# Patient Record
Sex: FEMALE | Race: WHITE | HISPANIC OR LATINO | Employment: FULL TIME | URBAN - METROPOLITAN AREA
[De-identification: names, ages, dates, MRNs, and addresses within clinical notes are randomized per-mention and may not be internally consistent; named-entity substitution may affect disease eponyms.]

---

## 2017-10-12 ENCOUNTER — APPOINTMENT (OUTPATIENT)
Dept: ULTRASOUND IMAGING | Facility: CLINIC | Age: 52
End: 2017-10-12
Attending: EMERGENCY MEDICINE
Payer: COMMERCIAL

## 2017-10-12 ENCOUNTER — APPOINTMENT (OUTPATIENT)
Dept: CT IMAGING | Facility: CLINIC | Age: 52
End: 2017-10-12
Attending: EMERGENCY MEDICINE
Payer: COMMERCIAL

## 2017-10-12 ENCOUNTER — HOSPITAL ENCOUNTER (OUTPATIENT)
Facility: CLINIC | Age: 52
Setting detail: OBSERVATION
Discharge: HOME | End: 2017-10-13
Attending: EMERGENCY MEDICINE | Admitting: HOSPITALIST
Payer: COMMERCIAL

## 2017-10-12 DIAGNOSIS — L03.116 CELLULITIS OF LEFT LOWER EXTREMITY: Primary | ICD-10-CM

## 2017-10-12 PROBLEM — L03.90 CELLULITIS: Status: ACTIVE | Noted: 2017-10-12

## 2017-10-12 LAB
ANION GAP SERPL CALCULATED.3IONS-SCNC: 4 MMOL/L (ref 3–14)
BASOPHILS # BLD AUTO: 0 10E9/L (ref 0–0.2)
BASOPHILS NFR BLD AUTO: 0 %
BUN SERPL-MCNC: 13 MG/DL (ref 7–30)
CALCIUM SERPL-MCNC: 8.6 MG/DL (ref 8.5–10.1)
CHLORIDE SERPL-SCNC: 104 MMOL/L (ref 94–109)
CO2 SERPL-SCNC: 30 MMOL/L (ref 20–32)
CREAT SERPL-MCNC: 0.65 MG/DL (ref 0.52–1.04)
D DIMER PPP FEU-MCNC: <0.3 UG/ML FEU (ref 0–0.5)
DIFFERENTIAL METHOD BLD: ABNORMAL
EOSINOPHIL # BLD AUTO: 0 10E9/L (ref 0–0.7)
EOSINOPHIL NFR BLD AUTO: 0.5 %
ERYTHROCYTE [DISTWIDTH] IN BLOOD BY AUTOMATED COUNT: 12.9 % (ref 10–15)
GFR SERPL CREATININE-BSD FRML MDRD: >90 ML/MIN/1.7M2
GLUCOSE SERPL-MCNC: 104 MG/DL (ref 70–99)
HCT VFR BLD AUTO: 35.5 % (ref 35–47)
HGB BLD-MCNC: 11.5 G/DL (ref 11.7–15.7)
IMM GRANULOCYTES # BLD: 0 10E9/L (ref 0–0.4)
IMM GRANULOCYTES NFR BLD: 0.2 %
INTERPRETATION ECG - MUSE: NORMAL
LYMPHOCYTES # BLD AUTO: 1.5 10E9/L (ref 0.8–5.3)
LYMPHOCYTES NFR BLD AUTO: 25.2 %
MCH RBC QN AUTO: 28.1 PG (ref 26.5–33)
MCHC RBC AUTO-ENTMCNC: 32.4 G/DL (ref 31.5–36.5)
MCV RBC AUTO: 87 FL (ref 78–100)
MONOCYTES # BLD AUTO: 0.7 10E9/L (ref 0–1.3)
MONOCYTES NFR BLD AUTO: 12.2 %
NEUTROPHILS # BLD AUTO: 3.8 10E9/L (ref 1.6–8.3)
NEUTROPHILS NFR BLD AUTO: 61.9 %
NRBC # BLD AUTO: 0 10*3/UL
NRBC BLD AUTO-RTO: 0 /100
PLATELET # BLD AUTO: 203 10E9/L (ref 150–450)
POTASSIUM SERPL-SCNC: 3.5 MMOL/L (ref 3.4–5.3)
RBC # BLD AUTO: 4.09 10E12/L (ref 3.8–5.2)
SODIUM SERPL-SCNC: 138 MMOL/L (ref 133–144)
TROPONIN I SERPL-MCNC: <0.015 UG/L (ref 0–0.04)
WBC # BLD AUTO: 6.1 10E9/L (ref 4–11)

## 2017-10-12 PROCEDURE — 25000128 H RX IP 250 OP 636: Performed by: PHYSICIAN ASSISTANT

## 2017-10-12 PROCEDURE — 84484 ASSAY OF TROPONIN QUANT: CPT | Performed by: EMERGENCY MEDICINE

## 2017-10-12 PROCEDURE — 99219 ZZC INITIAL OBSERVATION CARE,LEVL II: CPT | Performed by: PHYSICIAN ASSISTANT

## 2017-10-12 PROCEDURE — 80048 BASIC METABOLIC PNL TOTAL CA: CPT | Performed by: EMERGENCY MEDICINE

## 2017-10-12 PROCEDURE — 93005 ELECTROCARDIOGRAM TRACING: CPT

## 2017-10-12 PROCEDURE — 93971 EXTREMITY STUDY: CPT | Mod: LT

## 2017-10-12 PROCEDURE — 99285 EMERGENCY DEPT VISIT HI MDM: CPT | Mod: 25

## 2017-10-12 PROCEDURE — 99207 ZZC CDG-MDM COMPONENT: MEETS LOW - DOWN CODED: CPT | Performed by: PHYSICIAN ASSISTANT

## 2017-10-12 PROCEDURE — 25000128 H RX IP 250 OP 636: Performed by: EMERGENCY MEDICINE

## 2017-10-12 PROCEDURE — 96365 THER/PROPH/DIAG IV INF INIT: CPT | Mod: 59

## 2017-10-12 PROCEDURE — G0378 HOSPITAL OBSERVATION PER HR: HCPCS

## 2017-10-12 PROCEDURE — 85379 FIBRIN DEGRADATION QUANT: CPT | Performed by: EMERGENCY MEDICINE

## 2017-10-12 PROCEDURE — 71260 CT THORAX DX C+: CPT

## 2017-10-12 PROCEDURE — 25000132 ZZH RX MED GY IP 250 OP 250 PS 637: Performed by: PHYSICIAN ASSISTANT

## 2017-10-12 PROCEDURE — 85025 COMPLETE CBC W/AUTO DIFF WBC: CPT | Performed by: EMERGENCY MEDICINE

## 2017-10-12 RX ORDER — MULTIVITAMIN,THERAPEUTIC
1 TABLET ORAL DAILY PRN
COMMUNITY

## 2017-10-12 RX ORDER — PROCHLORPERAZINE 25 MG
25 SUPPOSITORY, RECTAL RECTAL EVERY 12 HOURS PRN
Status: DISCONTINUED | OUTPATIENT
Start: 2017-10-12 | End: 2017-10-13 | Stop reason: HOSPADM

## 2017-10-12 RX ORDER — LIDOCAINE 40 MG/G
CREAM TOPICAL
Status: DISCONTINUED | OUTPATIENT
Start: 2017-10-12 | End: 2017-10-13 | Stop reason: HOSPADM

## 2017-10-12 RX ORDER — SODIUM CHLORIDE 9 MG/ML
INJECTION, SOLUTION INTRAVENOUS CONTINUOUS
Status: DISCONTINUED | OUTPATIENT
Start: 2017-10-12 | End: 2017-10-13 | Stop reason: HOSPADM

## 2017-10-12 RX ORDER — PROCHLORPERAZINE MALEATE 5 MG
5-10 TABLET ORAL EVERY 6 HOURS PRN
Status: DISCONTINUED | OUTPATIENT
Start: 2017-10-12 | End: 2017-10-13 | Stop reason: HOSPADM

## 2017-10-12 RX ORDER — AMOXICILLIN 250 MG
1-2 CAPSULE ORAL 2 TIMES DAILY PRN
Status: DISCONTINUED | OUTPATIENT
Start: 2017-10-12 | End: 2017-10-13 | Stop reason: HOSPADM

## 2017-10-12 RX ORDER — POLYETHYLENE GLYCOL 3350 17 G/17G
17 POWDER, FOR SOLUTION ORAL DAILY PRN
Status: DISCONTINUED | OUTPATIENT
Start: 2017-10-12 | End: 2017-10-13 | Stop reason: HOSPADM

## 2017-10-12 RX ORDER — ONDANSETRON 4 MG/1
4 TABLET, ORALLY DISINTEGRATING ORAL EVERY 6 HOURS PRN
Status: DISCONTINUED | OUTPATIENT
Start: 2017-10-12 | End: 2017-10-13 | Stop reason: HOSPADM

## 2017-10-12 RX ORDER — BISACODYL 10 MG
10 SUPPOSITORY, RECTAL RECTAL DAILY PRN
Status: DISCONTINUED | OUTPATIENT
Start: 2017-10-12 | End: 2017-10-13 | Stop reason: HOSPADM

## 2017-10-12 RX ORDER — CEFTRIAXONE 2 G/1
2 INJECTION, POWDER, FOR SOLUTION INTRAMUSCULAR; INTRAVENOUS EVERY 24 HOURS
Status: DISCONTINUED | OUTPATIENT
Start: 2017-10-13 | End: 2017-10-13 | Stop reason: HOSPADM

## 2017-10-12 RX ORDER — ACETAMINOPHEN 325 MG/1
650 TABLET ORAL EVERY 4 HOURS PRN
Status: DISCONTINUED | OUTPATIENT
Start: 2017-10-12 | End: 2017-10-13 | Stop reason: HOSPADM

## 2017-10-12 RX ORDER — NALOXONE HYDROCHLORIDE 0.4 MG/ML
.1-.4 INJECTION, SOLUTION INTRAMUSCULAR; INTRAVENOUS; SUBCUTANEOUS
Status: DISCONTINUED | OUTPATIENT
Start: 2017-10-12 | End: 2017-10-13 | Stop reason: HOSPADM

## 2017-10-12 RX ORDER — IOPAMIDOL 755 MG/ML
500 INJECTION, SOLUTION INTRAVASCULAR ONCE
Status: COMPLETED | OUTPATIENT
Start: 2017-10-12 | End: 2017-10-12

## 2017-10-12 RX ORDER — ONDANSETRON 2 MG/ML
4 INJECTION INTRAMUSCULAR; INTRAVENOUS EVERY 6 HOURS PRN
Status: DISCONTINUED | OUTPATIENT
Start: 2017-10-12 | End: 2017-10-13 | Stop reason: HOSPADM

## 2017-10-12 RX ORDER — OXYCODONE HYDROCHLORIDE 5 MG/1
5-10 TABLET ORAL
Status: DISCONTINUED | OUTPATIENT
Start: 2017-10-12 | End: 2017-10-13 | Stop reason: HOSPADM

## 2017-10-12 RX ORDER — CEFTRIAXONE 2 G/1
2 INJECTION, POWDER, FOR SOLUTION INTRAMUSCULAR; INTRAVENOUS ONCE
Status: COMPLETED | OUTPATIENT
Start: 2017-10-12 | End: 2017-10-12

## 2017-10-12 RX ADMIN — SODIUM CHLORIDE 86 ML: 9 INJECTION, SOLUTION INTRAVENOUS at 12:38

## 2017-10-12 RX ADMIN — SODIUM CHLORIDE: 9 INJECTION, SOLUTION INTRAVENOUS at 15:02

## 2017-10-12 RX ADMIN — CEFTRIAXONE SODIUM 2 G: 2 INJECTION, POWDER, FOR SOLUTION INTRAMUSCULAR; INTRAVENOUS at 13:04

## 2017-10-12 RX ADMIN — ACETAMINOPHEN 650 MG: 325 TABLET, FILM COATED ORAL at 15:24

## 2017-10-12 RX ADMIN — IOPAMIDOL 63 ML: 755 INJECTION, SOLUTION INTRAVENOUS at 12:38

## 2017-10-12 ASSESSMENT — ENCOUNTER SYMPTOMS
FEVER: 1
CHILLS: 1
SHORTNESS OF BREATH: 0

## 2017-10-12 NOTE — PLAN OF CARE
Problem: Patient Care Overview  Goal: Plan of Care/Patient Progress Review  Outcome: No Change  PRIMARY DIAGNOSIS: Right leg cellulitis.   OUTPATIENT/OBSERVATION GOALS TO BE MET BEFORE DISCHARGE:  1. ADLs back to baseline: somewhat, patient has pain when she ambulates      2. Activity and level of assistance: Ambulating independently.      3. Pain status: Improved-controlled with oral pain medications.      4. Return to near baseline physical activity: Yes          Discharge Planner Nurse   Safe discharge environment identified: Yes  Barriers to discharge: No       Entered by: Triniyt Kwan 10/12/2017 4:35 PM     Please review provider order for any additional goals.   Nurse to notify provider when observation goals have been met and patient is ready for discharge.     Patient resting in bed. Patient leg outlined in marker redness and swelling. Patient pain 5 out of 10 denies needing anything for pain. Will continue to monitor.

## 2017-10-12 NOTE — ED PROVIDER NOTES
"  History     Chief Complaint:  Leg Pain    HPI   Karime Ortiz is a 52 year old female with a history of hypertension, hyperlipidemia, who presents to the emergency department today for evaluation of left leg pain. The patient experienced onset of left leg pain and redness 2 week ago. This morning at 0700, her symptoms got worse. She endorses chills, subjective fever, and occasional back pain recently, but denies shortness of breath.  She was last seen in clinic 3 days ago and was given 500 mg Keflex for 4 times daily, but states the antibiotic has not helped. The patient reports having vein surgery a couple of years ago and since then, redness in her left leg has worsened. She does not have a history of heart disease, bloodclots in her legs, or diabetes.     Allergies:  Drug allergies reviewed. No pertinent drug allergies.     Medications:    LEVOTHYROXINE SODIUM PO  LORazepam (ATIVAN PO)  cephALEXin (KEFLEX) 500 MG capsu    Past Medical History:    Anxiety  Thyroid Disease    Past Surgical History:    GYN Surgery     Family History:    Family history reviewed. No pertinent family history.    Social History:  The patient was accompanied to the ED by family.  Marital Status:       Review of Systems   Constitutional: Positive for chills and fever (subjective).   Respiratory: Negative for shortness of breath.    Musculoskeletal:        Left leg pain   All other systems reviewed and are negative.    Physical Exam     Patient Vitals for the past 24 hrs:   BP Temp Temp src Pulse Resp SpO2 Height Weight   10/12/17 1427 150/75 97.7  F (36.5  C) Oral 73 18 98 % - 65.2 kg (143 lb 11.8 oz)   10/12/17 1330 140/88 - - - - 100 % - -   10/12/17 1307 - - - - - 98 % - -   10/12/17 1306 133/76 - - - - - - -   10/12/17 1156 - - - - - 98 % - -   10/12/17 1155 141/77 - - - - - - -   10/12/17 1148 (!) 136/101 98.6  F (37  C) - 70 18 100 % 1.549 m (5' 1\") 66.7 kg (147 lb)       Physical Exam  General: Alert, appears " well-developed and well-nourished. Cooperative.     In mild distress  HEENT:  Head:  Atraumatic  Ears:  External ears are normal  Mouth/Throat:  Oropharynx is without erythema or exudate and mucous membranes are moist.   Eyes:   Conjunctivae normal and EOM are normal. No scleral icterus.    Pupils are equal, round, and reactive to light.   CV:  Normal rate, regular rhythm, normal heart sounds and radial and dorsalis pedis pulses are 2+ and symmetric.    Resp:  Breath sounds are clear bilaterally    Non-labored, no retractions or accessory muscle use  GI:  Abdomen is soft, no distension, no tenderness. No rebound or guarding.  MS:  Normal range of motion. Left lower extremity with 2+ edema.  Redness to left lower leg with no open wound. Tissue marker outlined area of redness.    Normal strength in all 4 extremities.     Back atraumatic.  kin:  Warm and dry.  No rash or lesions noted.  Neuro: Alert. Normal strength.  Sensation intact in all 4 extremities. GCS: 15  Psych:  Normal mood and affect.    Emergency Department Course     ECG:  ECG taken at 1158, ECG read at 1200  Normal sinus rhythm  Normal ECG  Rate 69 bpm. IA interval 164 ms. QRS duration 90 ms. QT/QTc 392/420 ms. P-R-T axes 24 48 27.    Imaging:  Radiology findings were communicated with the patient who voiced understanding of the findings.    US Lower Extremity Venous Duplex Left  IMPRESSION: No DVT demonstrated.    CT Chest Pulmonary Embolism w Contrast  IMPRESSION: No evidence for a pulmonary embolism. No definite acute  Abnormality.  Reading per radiology      Laboratory:  Laboratory findings were communicated with the patient who voiced understanding of the findings.    CBC: WBC 6.1, HGB 11.5 (L),   BMP: Glucose (Collected 1215) 104 (H)  o/w WNL (Creatinine 0.65)  Troponin I (Collected 1215) <0.015  D dimer (Collected 1215) <0.3     Interventions:  1304 Rocephin 2 g IV    Emergency Department Course:    1142 Nursing notes and vitals  reviewed.    1158 I performed an exam of the patient as documented above.     The patient provided a urine sample here in the emergency department. This was sent for laboratory testing, findings above.    IV was inserted and blood was drawn for laboratory testing, results above.    The patient was sent for a US lower extremity venous duplex and CT chest pulmonary embolism while in the emergency department, results above.     1314 I discussed the patient's plan of care with Dr. Cruz, hospitalist    1324 I spoke again with Dr. Cruz who agreed to admit the patient for further care.    I personally reviewed the blood, urine, CT, and US results with the patient and answered all related questions prior to admission.    I discussed the treatment plan with the patient. They expressed understanding of this plan and consented to admission. I discussed the patient with Dr. Bay Cruz, hospitalist, who will admit the patient to a monitored bed for further evaluation and treatment.    Impression & Plan      Medical Decision Making:  Karime Ortiz is a 52 year old female who presents to the emergency department today for evaluation of left lower extremity swelling worsening over the last several days with increasing redness in the lower leg area. Patient has been taking outpatient antibiotics for the last 3 days, Keflex 500 mg, without improvement. Patient notes that she is having some chest discomfort today although it appears transient and started yesterday. EKG is negative, nonischemic. Concern for possible PE with the setting of the left lower extremity swelling, I did send for a chest CT PE which was negative for pulmonary embolism. Lower extremity Doppler showed no evidence of DVT. Due to the redness and prior outpatient antibiotics with no improvement, indication for IV antibiotics.  She was given Rocephin IV for suspected cellulitis. Patient will be admitted to the hospital under the care of a  hospitalist.    Diagnosis:    ICD-10-CM    1. Cellulitis of left lower extremity L03.116      Disposition:   The patient is admitted into the care of Dr. Bay Cruz.    Scribe Disclosure:  I, Mariaelena Rojo, am serving as a scribe at 12:01 PM on 10/12/2017 to document services personally performed by Ander Amaro MD, based on my observations and the provider's statements to me.      [facility] EMERGENCY DEPARTMENT       Ander Amaro MD  10/12/17 [id number]

## 2017-10-12 NOTE — PHARMACY-ADMISSION MEDICATION HISTORY
Admission medication history interview status for this patient is complete. See [facility] admission navigator for allergy information, prior to admission medications and immunization status.     Medication history interview source(s):Patient  Medication history resources (including written lists, pill bottles, clinic record):called pharmacy patient did not know names of meds  [facility]    Changes made to PTA medication list:  Added: ASA, MVI,Lexapro, HCTZ  Deleted: lorazepam  Changed: levothyroxine    Actions taken by pharmacist (provider contacted, etc):None     Additional medication history information:None    Medication reconciliation/reorder completed by provider prior to medication history? No    Do you take OTC medications (eg tylenol, ibuprofen, fish oil, eye/ear drops, etc)? Y(Y/N)    For patients on insulin therapy:  ( N) Y/N)__________      Prior to Admission medications    Medication Sig Last Dose Taking? Auth Provider   HYDROCHLOROTHIAZIDE PO Take 25 mg by mouth daily 10/12/2017 at Unknown time Yes Unknown, Entered By History   Escitalopram Oxalate (LEXAPRO PO) Take 20 mg by mouth daily 10/12/2017 at Unknown time Yes Unknown, Entered By History   multivitamin, therapeutic (THERA-VIT) TABS tablet Take 1 tablet by mouth daily as needed Past Week at Unknown time Yes Unknown, Entered By History   ASPIRIN PO Take 325 mg by mouth daily as needed for moderate pain Past Week at Unknown time Yes Unknown, Entered By History   LEVOTHYROXINE SODIUM PO Take 75 mcg by mouth daily  10/12/2017 at Unknown time Yes Reported, Patient   cephALEXin (KEFLEX) 500 MG capsule Take 1 capsule (500 mg) by mouth 3 times daily 10/12/2017 at Unknown time Yes Armando Noel MD

## 2017-10-12 NOTE — ED NOTES
[facility]  ED Nurse Handoff Report    Karime Ortiz is a 52 year old female   ED Chief complaint: Leg Pain  . ED Diagnosis:   Final diagnoses:   Cellulitis of left lower extremity     Allergies: No Known Allergies    Code Status: Full Code  Activity level - Baseline/Home:  Independent. Activity Level - Current:   Independent. Lift room needed: No. Bariatric: No   Needed: No   Isolation: No. Infection: Not Applicable.     Vital Signs:   Vitals:    10/12/17 1156 10/12/17 1306 10/12/17 1307 10/12/17 1330   BP:  133/76  140/88   Pulse:       Resp:       Temp:       SpO2: 98%  98% 100%   Weight:       Height:           Cardiac Rhythm:  ,      Pain level:    Patient confused: No. Patient Falls Risk: No.   Elimination Status: Has voided   Patient Report - Initial Complaint: swelling and redness to  Left leg. Focused Assessment: *pain and redness, swelling to left lower leg**   Tests Performed: CT, US. Abnormal Results:   US Lower Extremity Venous Duplex Left (Final result) Result time: 10/12/17 13:16:11     Final result by Izaiah Whiting MD (10/12/17 13:16:11)     Impression:     IMPRESSION: No DVT demonstrated.    IZAIAH WHITING MD     Narrative:     ULTRASOUND VENOUS LEFT LOWER EXTREMITY  10/12/2017 1:06 PM     HISTORY: Left leg swelling and red. Evaluate for DVT.    COMPARISON: None.    TECHNIQUE: Ultrasound gray scale, Color Doppler flow, and spectral  Doppler waveform analysis performed.    FINDINGS:  The left common femoral, superficial femoral, popliteal and  posterior tibial veins are patent and fully compressible and  demonstrate normal venous Doppler flow. The visualized greater  saphenous vein is negative for thrombus. Multiple patent left leg  varicosities are noted in the calf.                 CT Chest Pulmonary Embolism w Contrast (Preliminary result) Result time: 10/12/17 12:57:27     Preliminary result by Dez Moses (10/12/17 12:57:27)     Impression:      IMPRESSION: No evidence for a pulmonary embolism. No definite acute  abnormality.     Narrative:     CT CHEST PULMONARY EMBOLISM WITH CONTRAST   10/12/2017 12:52 PM     HISTORY: Left swollen leg, chest pain, concern for pulmonary embolus.    TECHNIQUE:  CT of the chest was performed following the administration  of 63 mL Isovue-370. Radiation dose for this scan was reduced using  automated exposure control, adjustment of the mA and/or kV according  to patient size, or iterative reconstruction technique.    COMPARISON: None.    FINDINGS:  There is no evidence for a pulmonary embolism. Lungs are  clear.       Labs Ordered and Resulted from Time of ED Arrival Up to the Time of Departure from the ED   CBC WITH PLATELETS DIFFERENTIAL - Abnormal; Notable for the following:        Result Value    Hemoglobin 11.5 (*)     All other components within normal limits   BASIC METABOLIC PANEL - Abnormal; Notable for the following:     Glucose 104 (*)     All other components within normal limits   TROPONIN I   D DIMER QUANTITATIVE     .   Treatments provided: abx  Family Comments: at bedside  OBS brochure/video discussed/provided to patient:  Yes  ED Medications:   Medications   cefTRIAXone (ROCEPHIN) 2 g vial to attach to  ml bag for ADULTS or NS 50 ml bag for PEDS (2 g Intravenous New Bag 10/12/17 1304)   iopamidol (ISOVUE-370) solution 500 mL (63 mLs Intravenous Given 10/12/17 1238)   0.9% sodium chloride BOLUS (0 mLs Intravenous Stopped 10/12/17 1249)     Drips infusing:  No  For the majority of the shift, the patient's behavior Green. Interventions performed were none.     Severe Sepsis OR Septic Shock Diagnosis Present: No      ED Nurse Name/Phone Number: Daisy Esquivel,   1:41 PM    RECEIVING UNIT ED HANDOFF REVIEW    Above ED Nurse Handoff Report was reviewed: yes  Reviewed by: Carolina Alanis on October 12, 2017 at 1:58 PM

## 2017-10-12 NOTE — ED NOTES
ABCs intact. Pt c/o redness and L leg pain x 2 week. Pt was seen on Monday and was placed on Cephalexin 500mg. Pt states the abx has not helped

## 2017-10-12 NOTE — IP AVS SNAPSHOT
[facility] Observation Department    [address]    [facility] [address]    Phone:  [phone]                                       After Visit Summary   10/12/2017    Karime Ortiz    MRN: [id number]           After Visit Summary Signature Page     I have received my discharge instructions, and my questions have been answered. I have discussed any challenges I see with this plan with the nurse or doctor.    ..........................................................................................................................................  Patient/Patient Representative Signature      ..........................................................................................................................................  Patient Representative Print Name and Relationship to Patient    ..................................................               ................................................  Date                                            Time    ..........................................................................................................................................  Reviewed by Signature/Title    ...................................................              ..............................................  Date                                                            Time

## 2017-10-12 NOTE — H&P
[facility] Outpatient / Observation Unit  History and Physical Exam     Karime Ortiz MRN# [id number]   YOB: 1965 Age: 52 year old      Date of Admission:  10/12/2017    Primary care provider: Deep Mayer          Assessment:   Karime Ortiz is a 52 year old female with a PMH significant for HTN, anxiety/depression and hypothyroid, who presents with worsening LLE cellulitis that has not resolved with outpatient oral antibiotics. The patient was seen by her PCP at [facility] on 10/9 and diagnosed with LLE cellulitis. She was given Keflex 500mg QID for 7 days, which she has been taking for the past 3 days with no improvement. She continues to have LLE erythema, edema, warmth and tenderness with associated chills, thus she presented to the ER for further evaluation.     Work up in the ED reveals: stable vitals with temp of 98.6, pulse 70, BP ranged from 133//77. Oxygen sats were normal at  on RA. Labs revealed normal BMP, CBC showed a wbc of 6.1, hgb 11.5, plt 203. Troponin was normal at <0.015. D dimer was negative at <0.3. LLE doppler US was negative for DVT. Chest CT with PE protocol was also negative. She was given a dose of Rocephin 2 grams IV x 1 and 1L IVF NS bolus.     Patient will be registered to Observation for further evaluation and symptom management.     1. LLE Cellulitis - failed 3 days of OP oral ABX. Will give another dose of Rocephin 2grams IV in the AM. If improving then will plan to discharge on oral Clindamycin 300mg PO TID for 7 days. She will continue to elevate and wear her compression socks once improved.     2. HTN - continue PTA HCTZ.    3. Hypothyroid - continue PTA levothyroxine.     4. Anxiety/Depression - continue PTA Lexapro.     5. DVT prophylaxis: pt at low risk, encourage ambulation.    6. FULL CODE              Chief Complaint:   LLE redness, pain and swelling         History of Present Illness:   Karime Ortiz is a 52 year old female with a PMH  significant for HTN, anxiety/depression and hypothyroid, who presents with worsening LLE cellulitis that has not resolved with outpatient oral antibiotics. The patient developed increasing LLE redness, swelling, warmth and pain since around 10/1. She was thus seen by her PCP at [facility] on 10/9 and diagnosed with LLE cellulitis. She was given Keflex 500mg QID for 7 days, which she has been taking for the past 3 days with no improvement. She continues to have LLE erythema, edema, warmth and tenderness with associated chills, thus she presented to the ER for further evaluation.     Work up in the ED reveals: stable vitals with temp of 98.6, pulse 70, BP ranged from 133//77. Oxygen sats were normal at  on RA. Labs revealed normal BMP, CBC showed a wbc of 6.1, hgb 11.5, plt 203. Troponin was normal at <0.015. D dimer was negative at <0.3. LLE doppler US was negative for DVT. Chest CT with PE protocol was also negative. She was given a dose of Rocephin 2 grams IV x 1 and 1L IVF NS bolus.           Past Medical History:     Past Medical History:   Diagnosis Date     Anxiety      Thyroid disease    HTN          Past Surgical History:     Past Surgical History:   Procedure Laterality Date     GYN SURGERY             Social History:   . Denies smoking and alcohol.           Family History:   Reviewed and non contributory to this admission.          Allergies:    No Known Allergies         Medications:     Prior to Admission medications    Medication Sig Last Dose Taking? Auth Provider   LEVOTHYROXINE SODIUM PO    Reported, Patient   LORazepam (ATIVAN PO)    Reported, Patient   cephALEXin (KEFLEX) 500 MG capsule Take 1 capsule (500 mg) by mouth 3 times daily   Armando Noel MD            Review of Systems:   A Comprehensive greater than 10 system review of systems was carried out.  Pertinent positives and negatives are noted above.  Otherwise negative for contributory information.  "    CONSTITUTIONAL:POSITIVE  for chills and NEGATIVE  for fever, myalgias and sweats  INTEGUMENTARY/SKIN: NEGATIVE for bruising, lumps or bumps and non-healing lesion and POSITIVE for pruritis lower legs left and rash lower legs left  ENT/MOUTH: NEGATIVE for ear, mouth and throat problems  RESP:NEGATIVE for significant cough or SOB  CV: NEGATIVE for chest pain, palpitations or peripheral edema  GI: NEGATIVE for nausea, abdominal pain, heartburn, or change in bowel habits  MUSCULOSKELETAL: NEGATIVE for significant arthralgias or myalgia  NEURO: NEGATIVE for weakness, dizziness or paresthesias         Physical Exam:   Blood pressure 140/88, pulse 70, temperature 98.6  F (37  C), resp. rate 18, height 1.549 m (5' 1\"), weight 66.7 kg (147 lb), SpO2 100 %.    GENERAL: healthy, alert and no distress, non-toxic appearing  EYES: Eyes grossly normal to inspection, extraocular movements - intact, and PERRL  HENT: Nose- normal; Mouth- no ulcers, no lesions.   ORAL MUCOSA: within normal limits, no visible lesions  NECK: no tenderness, no adenopathy, no asymmetry, no masses, no stiffness; thyroid- normal to palpation  RESP: lungs clear to auscultation - no rales, no rhonchi, no wheezes  CV: regular rates and rhythm, normal S1 S2, no S3 or S4 and no murmur, no click or rub   ABDOMEN: soft, nontender, without hepatosplenomegaly or masses, aorta normal and bowel sounds normal  MS: extremities- no gross deformities noted, no edema  SKIN: roughly 15cm area of erythema, warmth and tenderness to the mid to lower LLE - skin marked  NEURO: strength and tone- normal, sensory exam- grossly normal, mentation- intact, speech- normal, reflexes- symmetric  BACK: no CVA tenderness, no paralumbar tenderness  PSYCH: Alert and oriented times 3. Affect is normal.            Data:       Recent Labs  Lab 10/12/17  1215   WBC 6.1   HGB 11.5*   HCT 35.5   MCV 87             Lab Results   Component Value Date     10/12/2017     " 07/02/2016    Lab Results   Component Value Date    CHLORIDE 104 10/12/2017    CHLORIDE 107 07/02/2016    Lab Results   Component Value Date    BUN 13 10/12/2017    BUN 13 07/02/2016      Lab Results   Component Value Date    POTASSIUM 3.5 10/12/2017    POTASSIUM 3.6 07/02/2016    Lab Results   Component Value Date    CO2 30 10/12/2017    CO2 28 07/02/2016    Lab Results   Component Value Date    CR 0.65 10/12/2017    CR 0.60 07/02/2016        No results for input(s): CULT in the last 168 hours.    Recent Labs  Lab 10/12/17  1215      POTASSIUM 3.5   CHLORIDE 104   CO2 30   ANIONGAP 4   *   BUN 13   CR 0.65   GFRESTIMATED >90   GFRESTBLACK >90   СВЕТЛАНА 8.6       Recent Labs  Lab 10/12/17  1215   DD <0.3       Recent Labs  Lab 10/12/17  1215      POTASSIUM 3.5   CHLORIDE 104   CO2 30   *     No results for input(s): INR in the last 168 hours.  No results for input(s): LACT in the last 168 hours.  No results for input(s): LIPASE in the last 168 hours.  No results for input(s): TSH in the last 168 hours.    Recent Labs  Lab 10/12/17  1215   TROPI <0.015     No results for input(s): COLOR, APPEARANCE, URINEGLC, URINEBILI, URINEKETONE, SG, UBLD, URINEPH, PROTEIN, UROBILINOGEN, NITRITE, LEUKEST, RBCU, WBCU in the last 168 hours.        Recent Results (from the past 48 hour(s))   CT Chest Pulmonary Embolism w Contrast    Narrative    CT CHEST PULMONARY EMBOLISM WITH CONTRAST   10/12/2017 12:52 PM     HISTORY: Left swollen leg, chest pain, concern for pulmonary embolus.    TECHNIQUE:  CT of the chest was performed following the administration  of 63 mL Isovue-370. Radiation dose for this scan was reduced using  automated exposure control, adjustment of the mA and/or kV according  to patient size, or iterative reconstruction technique.    COMPARISON: None.    FINDINGS:  There is no evidence for a pulmonary embolism. Lungs are  clear.      Impression    IMPRESSION: No evidence for a pulmonary embolism.  No definite acute  abnormality.   US Lower Extremity Venous Duplex Left    Narrative    ULTRASOUND VENOUS LEFT LOWER EXTREMITY  10/12/2017 1:06 PM     HISTORY: Left leg swelling and red. Evaluate for DVT.    COMPARISON: None.    TECHNIQUE: Ultrasound gray scale, Color Doppler flow, and spectral  Doppler waveform analysis performed.    FINDINGS:  The left common femoral, superficial femoral, popliteal and  posterior tibial veins are patent and fully compressible and  demonstrate normal venous Doppler flow. The visualized greater  saphenous vein is negative for thrombus. Multiple patent left leg  varicosities are noted in the calf.      Impression    IMPRESSION: No DVT demonstrated.    MD Radha MEJIAS PA-C

## 2017-10-12 NOTE — IP AVS SNAPSHOT
MRN:[id number]                      After Visit Summary   10/12/2017    Karime Ortiz    MRN: [id number]           Thank you!     Thank you for choosing [facility] for your care. Our goal is always to provide you with excellent care. Hearing back from our patients is one way we can continue to improve our services. Please take a few minutes to complete the written survey that you may receive in the mail after you visit. If you would like to speak to someone directly about your visit please contact Patient Relations at [phone]. Thank you!          Patient Information     Date Of Birth          1965        About your hospital stay     You were admitted on:  October 12, 2017 You last received care in the:  [facility] Observation Department    You were discharged on:  October 13, 2017        Reason for your hospital stay       You were hospitalized with left leg cellulitis.  Ultrasounds were performed with no evidence of an abscess or blood clot.  Your symptoms improved with IV antibiotics.                  Who to Call     For medical emergencies, please call 911.  For non-urgent questions about your medical care, please call your primary care provider or clinic, [phone]          Attending Provider     Provider Specialty    Ander Amaro MD Emergency Medicine    Bay Cruz MD Internal Medicine       Primary Care Provider Office Phone # Fax #    [facility] [phone]      After Care Instructions     Activity       Your activity upon discharge: activity as tolerated.  Elevated the LLE and wear gigi hose            Diet       Follow this diet upon discharge: Orders Placed This Encounter      Regular Diet Adult            Monitor and record       Notify for fever >101.5; black or bloody stools; severe, persistent, or worsening nausea, vomiting, abdominal pain or distention, diarrhea, constipation; chest pain, difficulty  "breathing, swelling; dizziness, weakness, lightheadedness, fainting.  Proceed to the nearest emergency room for any urgent concerns.                  Follow-up Appointments     Follow Up and recommended labs and tests       Follow up with your PCP within one week.            Follow-up and recommended labs and tests        Follow up with your surgeon for varicose vein management.                             Pending Results     No orders found for last 3 day(s).            Statement of Approval     Ordered          10/13/17 1126  I have reviewed and agree with all the recommendations and orders detailed in this document.  EFFECTIVE NOW     Approved and electronically signed by:  Cassandra Correa PA-C             Admission Information     Date & Time Provider Department Dept. Phone    10/12/2017 Bay Curz MD [facility] Observation Department [phone]      Your Vitals Were     Blood Pressure Pulse Temperature Respirations Height Weight    140/56 (BP Location: Right arm) 60 97.7  F (36.5  C) (Oral) 18 1.549 m (5' 1\") 66.3 kg (146 lb 3.2 oz)    Pulse Oximetry BMI (Body Mass Index)                96% 27.62 kg/m2          [employer] Information     [employer] lets you send messages to your doctor, view your test results, renew your prescriptions, schedule appointments and more. To sign up, go to [email or web address] . Click on \"Log in\" on the left side of the screen, which will take you to the Welcome page. Then click on \"Sign up Now\" on the right side of the page.     You will be asked to enter the access code listed below, as well as some personal information. Please follow the directions to create your username and password.     Your access code is: [email or web address]  Expires: 2018 11:24 AM     Your access code will  in 90 days. If you need help or a new code, please call your [place] clinic or [phone].        Care EveryWhere ID     This is your Care EveryWhere ID. This could be used " by other organizations to access your [place] medical records  [id number]        Equal Access to Services     JOYCE WALL : Hadii emma Butt, wayanetda rae, qachrista burnsleetae robledo, saba loznaocherellemichael mahan. So [facility] [phone].    ATENCIÓN: Si habla español, tiene a veliz disposición servicios gratuitos de asistencia lingüística. Stefano al [phone].    We comply with applicable federal civil rights laws and [place] laws. We do not discriminate on the basis of race, color, national origin, age, disability, sex, sexual orientation, or gender identity.               Review of your medicines      START taking        Dose / Directions    clindamycin 300 MG capsule   Commonly known as:  CLEOCIN   Used for:  Cellulitis of left lower extremity        Dose:  300 mg   Take 1 capsule (300 mg) by mouth 4 times daily   Quantity:  28 capsule   Refills:  0         CONTINUE these medicines which have NOT CHANGED        Dose / Directions    ASPIRIN PO        Dose:  325 mg   Take 325 mg by mouth daily as needed for moderate pain   Refills:  0       HYDROCHLOROTHIAZIDE PO        Dose:  25 mg   Take 25 mg by mouth daily   Refills:  0       LEVOTHYROXINE SODIUM PO        Dose:  75 mcg   Take 75 mcg by mouth daily   Refills:  0       LEXAPRO PO        Dose:  20 mg   Take 20 mg by mouth daily   Refills:  0       multivitamin, therapeutic Tabs tablet        Dose:  1 tablet   Take 1 tablet by mouth daily as needed   Refills:  0         STOP taking     cephALEXin 500 MG capsule   Commonly known as:  KEFLEX                Where to get your medicines      These medications were sent to [facility] [address] [place] [address] [address]VE [address] AT [facility] & [address] [place] [address] [facility] [id number]     Phone:  [phone]     clindamycin 300 MG capsule               ANTIBIOTIC INSTRUCTION     You've Been Prescribed an Antibiotic - Now What?  Your healthcare team thinks that  you or your loved one might have an infection. Some infections can be treated with antibiotics, which are powerful, life-saving drugs. Like all medications, antibiotics have side effects and should only be used when necessary. There are some important things you should know about your antibiotic treatment.      Your healthcare team may run tests before you start taking an antibiotic.    Your team may take samples (e.g., from your blood, urine or other areas) to run tests to look for bacteria. These test can be important to determine if you need an antibiotic at all and, if you do, which antibiotic will work best.      Within a few days, your healthcare team might change or even stop your antibiotic.    Your team may start you on an antibiotic while they are working to find out what is making you sick.    Your team might change your antibiotic because test results show that a different antibiotic would be better to treat your infection.    In some cases, once your team has more information, they learn that you do not need an antibiotic at all. They may find out that you don't have an infection, or that the antibiotic you're taking won't work against your infection. For example, an infection caused by a virus can't be treated with antibiotics. Staying on an antibiotic when you don't need it is more likely to be harmful than helpful.      You may experience side effects from your antibiotic.    Like all medications, antibiotics have side effects. Some of these can be serious.    Let you healthcare team know if you have any known allergies when you are admitted to the hospital.    One significant side effect of nearly all antibiotics is the risk of severe and sometimes deadly diarrhea caused by Clostridium difficile (C. Difficile). This occurs when a person takes antibiotics because some good germs are destroyed. Antibiotic use allows C. diificile to take over, putting patients at high risk for this serious  infection.    As a patient or caregiver, it is important to understand your or your loved one's antibiotic treatment. It is especially important for caregivers to speak up when patients can't speak for themselves. Here are some important questions to ask your healthcare team.    What infection is this antibiotic treating and how do you know I have that infection?    What side effects might occur from this antibiotic?    How long will I need to take this antibiotic?    Is it safe to take this antibiotic with other medications or supplements (e.g., vitamins) that I am taking?     Are there any special directions I need to know about taking this antibiotic? For example, should I take it with food?    How will I be monitored to know whether my infection is responding to the antibiotic?    What tests may help to make sure the right antibiotic is prescribed for me?      Information provided by:  www.cdc.gov/getsmart  U.S. Department of Health and Human Services  Centers for disease Control and Prevention  National Center for Emerging and Zoonotic Infectious Diseases  Division of Healthcare Quality Promotion         Protect others around you: Learn how to safely use, store and throw away your medicines at www.disposemymeds.org.             Medication List: This is a list of all your medications and when to take them. Check marks below indicate your daily home schedule. Keep this list as a reference.      Medications           Morning Afternoon Evening Bedtime As Needed    ASPIRIN PO   Take 325 mg by mouth daily as needed for moderate pain                                clindamycin 300 MG capsule   Commonly known as:  CLEOCIN   Take 1 capsule (300 mg) by mouth 4 times daily                                HYDROCHLOROTHIAZIDE PO   Take 25 mg by mouth daily                                LEVOTHYROXINE SODIUM PO   Take 75 mcg by mouth daily                                LEXAPRO PO   Take 20 mg by mouth daily   Last time this  was given:  20 mg on 10/13/2017  9:29 AM                                multivitamin, therapeutic Tabs tablet   Take 1 tablet by mouth daily as needed

## 2017-10-12 NOTE — PLAN OF CARE
Problem: Patient Care Overview  Goal: Plan of Care/Patient Progress Review  ROOM # 202-1     Living Situation (if not independent, order SW consult): Ind with   Facility name:  : [phone]- Daughter     Activity level at baseline: Ind  Activity level on admit: Ind        Patient registered to observation; given Patient Bill of Rights; given the opportunity to ask questions about observation status and their plan of care.  Patient has been oriented to the observation room, bathroom and call light is in place.     Discussed discharge goals and expectations with patient/family.

## 2017-10-13 ENCOUNTER — APPOINTMENT (OUTPATIENT)
Dept: ULTRASOUND IMAGING | Facility: CLINIC | Age: 52
End: 2017-10-13
Attending: PHYSICIAN ASSISTANT
Payer: COMMERCIAL

## 2017-10-13 VITALS
TEMPERATURE: 97.7 F | SYSTOLIC BLOOD PRESSURE: 140 MMHG | RESPIRATION RATE: 18 BRPM | OXYGEN SATURATION: 96 % | DIASTOLIC BLOOD PRESSURE: 56 MMHG | HEART RATE: 60 BPM | WEIGHT: 146.2 LBS | BODY MASS INDEX: 27.6 KG/M2 | HEIGHT: 61 IN

## 2017-10-13 LAB
BASOPHILS # BLD AUTO: 0 10E9/L (ref 0–0.2)
BASOPHILS NFR BLD AUTO: 0.2 %
DIFFERENTIAL METHOD BLD: ABNORMAL
EOSINOPHIL # BLD AUTO: 0.1 10E9/L (ref 0–0.7)
EOSINOPHIL NFR BLD AUTO: 1.7 %
ERYTHROCYTE [DISTWIDTH] IN BLOOD BY AUTOMATED COUNT: 13 % (ref 10–15)
HCT VFR BLD AUTO: 34.7 % (ref 35–47)
HGB BLD-MCNC: 11.2 G/DL (ref 11.7–15.7)
IMM GRANULOCYTES # BLD: 0 10E9/L (ref 0–0.4)
IMM GRANULOCYTES NFR BLD: 0.2 %
LYMPHOCYTES # BLD AUTO: 1.5 10E9/L (ref 0.8–5.3)
LYMPHOCYTES NFR BLD AUTO: 36.7 %
MCH RBC QN AUTO: 28.5 PG (ref 26.5–33)
MCHC RBC AUTO-ENTMCNC: 32.3 G/DL (ref 31.5–36.5)
MCV RBC AUTO: 88 FL (ref 78–100)
MONOCYTES # BLD AUTO: 0.5 10E9/L (ref 0–1.3)
MONOCYTES NFR BLD AUTO: 13 %
NEUTROPHILS # BLD AUTO: 2 10E9/L (ref 1.6–8.3)
NEUTROPHILS NFR BLD AUTO: 48.2 %
NRBC # BLD AUTO: 0 10*3/UL
NRBC BLD AUTO-RTO: 0 /100
PLATELET # BLD AUTO: 182 10E9/L (ref 150–450)
RBC # BLD AUTO: 3.93 10E12/L (ref 3.8–5.2)
WBC # BLD AUTO: 4.1 10E9/L (ref 4–11)

## 2017-10-13 PROCEDURE — 76882 US LMTD JT/FCL EVL NVASC XTR: CPT | Mod: LT

## 2017-10-13 PROCEDURE — 36415 COLL VENOUS BLD VENIPUNCTURE: CPT | Performed by: PHYSICIAN ASSISTANT

## 2017-10-13 PROCEDURE — 85025 COMPLETE CBC W/AUTO DIFF WBC: CPT | Performed by: PHYSICIAN ASSISTANT

## 2017-10-13 PROCEDURE — 25000128 H RX IP 250 OP 636: Performed by: PHYSICIAN ASSISTANT

## 2017-10-13 PROCEDURE — 99217 ZZC OBSERVATION CARE DISCHARGE: CPT | Performed by: PHYSICIAN ASSISTANT

## 2017-10-13 PROCEDURE — 96367 TX/PROPH/DG ADDL SEQ IV INF: CPT

## 2017-10-13 PROCEDURE — 25000132 ZZH RX MED GY IP 250 OP 250 PS 637: Performed by: PHYSICIAN ASSISTANT

## 2017-10-13 PROCEDURE — G0378 HOSPITAL OBSERVATION PER HR: HCPCS

## 2017-10-13 RX ORDER — LEVOTHYROXINE SODIUM 75 UG/1
75 TABLET ORAL DAILY
Status: DISCONTINUED | OUTPATIENT
Start: 2017-10-13 | End: 2017-10-13 | Stop reason: HOSPADM

## 2017-10-13 RX ORDER — ESCITALOPRAM OXALATE 20 MG/1
20 TABLET ORAL DAILY
Status: DISCONTINUED | OUTPATIENT
Start: 2017-10-13 | End: 2017-10-13 | Stop reason: HOSPADM

## 2017-10-13 RX ORDER — CLINDAMYCIN HCL 300 MG
300 CAPSULE ORAL 4 TIMES DAILY
Qty: 28 CAPSULE | Refills: 0 | Status: SHIPPED | OUTPATIENT
Start: 2017-10-13

## 2017-10-13 RX ADMIN — ESCITALOPRAM OXALATE 20 MG: 20 TABLET ORAL at 09:29

## 2017-10-13 RX ADMIN — ACETAMINOPHEN 650 MG: 325 TABLET, FILM COATED ORAL at 08:02

## 2017-10-13 RX ADMIN — SODIUM CHLORIDE: 9 INJECTION, SOLUTION INTRAVENOUS at 00:18

## 2017-10-13 RX ADMIN — CEFTRIAXONE SODIUM 2 G: 2 INJECTION, POWDER, FOR SOLUTION INTRAMUSCULAR; INTRAVENOUS at 07:51

## 2017-10-13 NOTE — PLAN OF CARE
Problem: Patient Care Overview  Goal: Plan of Care/Patient Progress Review  Outcome: No Change  PRIMARY DIAGNOSIS: Right leg cellulitis.   OUTPATIENT/OBSERVATION GOALS TO BE MET BEFORE DISCHARGE:  1. ADLs back to baseline: Independent- mild pain with ambulation      2. Activity and level of assistance: Independent      3. Pain status: Improved-controlled with oral pain medications.      4. Return to near baseline physical activity: Yes      Discharge Planner Nurse   Safe discharge environment identified: Yes  Barriers to discharge: No     Pt resting comfortably in bed,  VSS.  C/o of mild pain did not want any intervention at this time.   LLE slightly red, area outlined, not going beyond marked areas. IVF infusing NS at 100ml/hr. Will continue to assess, monitor, and offer supportive cares.

## 2017-10-13 NOTE — PLAN OF CARE
Problem: Patient Care Overview  Goal: Plan of Care/Patient Progress Review  Outcome: Adequate for Discharge Date Met:  10/13/17  Patient's After Visit Summary was reviewed with patient and family  Patient verbalized understanding of After Visit Summary, recommended follow up and was given an opportunity to ask questions.   Discharge medications sent home with patient/family: NA  Discharged with family      OBSERVATION patient END time: 1143

## 2017-10-13 NOTE — DISCHARGE SUMMARY
[facility] Observation Unit Discharge Summary          Karime Ortiz MRN# [id number]   Age: 52 year old YOB: 1965     Date of Admission:  10/12/2017  Date of Discharge::  10/13/2017  Admitting Physician:  Bay Cruz MD  Discharge Physician:  Cassandra Correa PA-C  Primary Physician: [facility]     Primary Discharge Diagnoses:   LLE Cellulitis     Secondary Discharge Diagnoses:   LLE Varicose Veins  HTN  Anxiety  Depression  Hypothyroidism     Hospital Course:   For detail history, please refer to H & P from 10/12/2017. In brief, this is a 52 year old female with a PMH significant for HTN, anxiety/depression and hypothyroid, varicose veins who presented to the ED on 10/12/17 with worsening LLE cellulitis that has not resolved with outpatient oral antibiotics. The patient was seen by her PCP at [facility] on 10/9 and diagnosed with LLE cellulitis. She was given Keflex 500mg QID for 7 days, which she has been taking for the past 3 days with no improvement. She continues to have LLE erythema, edema, warmth and tenderness with associated chills, thus she presented to the ER for further evaluation.    Work up in the ED revealed: stable vitals with temp of 98.6, pulse 70, BP ranged from 133//77. Oxygen sats were normal at  on RA. Labs revealed normal BMP, CBC showed a wbc of 6.1, hgb 11.5, plt 203. Troponin was normal at <0.015. D dimer was negative at <0.3. LLE doppler US was negative for DVT. Chest CT with PE protocol was also negative. She was given a dose of Rocephin 2 grams IV x 1 and 1L IVF NS bolus.    Patient was admitted to the observation unit.  She remained hemodynamically stable, afebrile with a normal wbc.  A LLE non vascular ultrasound was obtained which showed soft tissue edema with no abscess seen and patent varicose veins in the subcutaneous tissue.  She received 2 doses of IV Ceftriaxone with improvement in erythema.  Patient was discharged with  Clindamycin and was given NINA hose to help with her chronic venous insufficiency.  She will follow up with PCP within one week.  Patient will follow up with her previous surgeon for further evaluation of varicose vein removal.    Procedures/Imaging:     Results for orders placed or performed during the hospital encounter of 10/12/17   US Lower Extremity Venous Duplex Left    Narrative    ULTRASOUND VENOUS LEFT LOWER EXTREMITY  10/12/2017 1:06 PM     HISTORY: Left leg swelling and red. Evaluate for DVT.    COMPARISON: None.    TECHNIQUE: Ultrasound gray scale, Color Doppler flow, and spectral  Doppler waveform analysis performed.    FINDINGS:  The left common femoral, superficial femoral, popliteal and  posterior tibial veins are patent and fully compressible and  demonstrate normal venous Doppler flow. The visualized greater  saphenous vein is negative for thrombus. Multiple patent left leg  varicosities are noted in the calf.      Impression    IMPRESSION: No DVT demonstrated.    IZAAIH WHITING MD   CT Chest Pulmonary Embolism w Contrast    Narrative    CT CHEST PULMONARY EMBOLISM WITH CONTRAST   10/12/2017 12:52 PM     HISTORY: Left swollen leg, chest pain, concern for pulmonary embolus.    TECHNIQUE:  CT of the chest was performed following the administration  of 63 mL Isovue-370. Radiation dose for this scan was reduced using  automated exposure control, adjustment of the mA and/or kV according  to patient size, or iterative reconstruction technique.    COMPARISON: None.    FINDINGS:  There is no evidence for a pulmonary embolism. Lungs are  clear.      Impression    IMPRESSION: No evidence for a pulmonary embolism. No definite acute  abnormality.    YOLY LÓPEZ MD   US Extremity Non Vascular Left    Narrative    NONVASCULAR ULTRASOUND LEFT LOWER LEG   10/13/2017 8:52 AM    HISTORY: Swelling and cellulitis. The concern is for an abscess.    COMPARISON: None.    FINDINGS: Sonographic examination was  "performed of the area of  clinical concern in the left lower leg. There appears to be diffuse  soft tissue edema. There are moderately prominent varicose veins in  the subcutaneous tissues. These appear to be patent. No other abnormal  mass or fluid collection is seen. Specifically, there is no evidence  of an abscess.      Impression    IMPRESSION:  1. Diffuse soft tissue edema with no abscess seen.  2. Patent varicose veins in the subcutaneous tissues.     KRISTEN BEARDEN MD       Consultations:   None    Code Status:   Full    Allergies:   No Known Allergies     Subjective:   Patient reports history of LLE varicose veins with ongoing intermittent swelling.  Increased medial LLE swelling and redness over the past one week with pain to palpation.  Erythema has improved overnight.  .      Physical Exam:   Blood pressure 140/56, pulse 60, temperature 97.7  F (36.5  C), temperature source Oral, resp. rate 18, height 1.549 m (5' 1\"), weight 66.3 kg (146 lb 3.2 oz), SpO2 96 %. on room air  General: Alert, interactive, NAD, sitting up in bed  HEENT: AT/NC, sclera anicteric, PERRL, EOMI  Resp: clear to auscultation bilaterally, no crackles or wheezes  Cardiac: regular rate and rhythm, no murmur  Abdomen: Soft, nontender, nondistended. +BS.  No rebound or guarding.  Extremities/Skin: erythema, warmth and tenderness to the mid/lower LLE.  Erythema receeding from previously marked areas.  2 areas of more firm tender skin without fluctuance.     Neuro: Alert & oriented x 3, no focal deficits, moves all extremities equally     Discharge Medicatios:        Discharge Medication List as of 10/13/2017 11:29 AM      START taking these medications    Details   clindamycin (CLEOCIN) 300 MG capsule Take 1 capsule (300 mg) by mouth 4 times daily, Disp-28 capsule, R-0, E-Prescribe         CONTINUE these medications which have NOT CHANGED    Details   HYDROCHLOROTHIAZIDE PO Take 25 mg by mouth daily, Historical    "   Escitalopram Oxalate (LEXAPRO PO) Take 20 mg by mouth daily, Historical      multivitamin, therapeutic (THERA-VIT) TABS tablet Take 1 tablet by mouth daily as needed, Historical      ASPIRIN PO Take 325 mg by mouth daily as needed for moderate pain, Historical      LEVOTHYROXINE SODIUM PO Take 75 mcg by mouth daily , Historical         STOP taking these medications       cephALEXin (KEFLEX) 500 MG capsule Comments:   Reason for Stopping:               Instructions Given to Patient as Discharge:     Discharge Procedure Orders  Reason for your hospital stay   Order Comments: You were hospitalized with left leg cellulitis.  Ultrasounds were performed with no evidence of an abscess or blood clot.  Your symptoms improved with IV antibiotics.     Follow-up and recommended labs and tests    Order Comments: Follow up with your surgeon for varicose vein management.     Follow Up and recommended labs and tests   Order Comments: Follow up with your PCP within one week.     Activity   Order Comments: Your activity upon discharge: activity as tolerated.  Elevated the LLE and wear gigi hose   Order Specific Question Answer Comments   Is discharge order? Yes      Monitor and record   Order Comments: Notify for fever >101.5; black or bloody stools; severe, persistent, or worsening nausea, vomiting, abdominal pain or distention, diarrhea, constipation; chest pain, difficulty breathing, swelling; dizziness, weakness, lightheadedness, fainting.  Proceed to the nearest emergency room for any urgent concerns.     Full Code     Diet   Order Comments: Follow this diet upon discharge: Orders Placed This Encounter     Regular Diet Adult   Order Specific Question Answer Comments   Is discharge order? Yes          Pending Tests at Discharge:   None    Discharge Disposition:   Discharged to home     Cassandra Correa MS, PA-C  Hospitalist Service  Pager [phone]    >30 minutes was spent in discharge planning, care coordination, physical  examination and medication reconciliation on the date of discharge, 10/16/2017

## 2017-10-13 NOTE — PLAN OF CARE
Problem: Patient Care Overview  Goal: Plan of Care/Patient Progress Review  Outcome: No Change  PRIMARY DIAGNOSIS: Right leg cellulitis.   OUTPATIENT/OBSERVATION GOALS TO BE MET BEFORE DISCHARGE:  1. ADLs back to baseline: Independent- mild pain with ambulation      2. Activity and level of assistance: Independent      3. Pain status: Improved-controlled with oral pain medications.      4. Return to near baseline physical activity: Yes      Discharge Planner Nurse   Safe discharge environment identified: Yes  Barriers to discharge: Not once medically cleared.     Pt has been sleeping most of the night,  VSS. C/o of mild pain did not want any intervention at this time.   LLE slightly red, area outlined, not going beyond marked areas. IVF infusing NS at 100ml/hr. Will continue to assess, monitor, and offer supportive cares.

## 2017-10-13 NOTE — PLAN OF CARE
Problem: Patient Care Overview  Goal: Plan of Care/Patient Progress Review  Outcome: No Change  PRIMARY DIAGNOSIS: Right leg cellulitis.   OUTPATIENT/OBSERVATION GOALS TO BE MET BEFORE DISCHARGE:  1. ADLs back to baseline: Independent- mild pain with ambulation      2. Activity and level of assistance: Independent      3. Pain status: Improved-controlled with oral pain medications.      4. Return to near baseline physical activity: Yes      Discharge Planner Nurse   Safe discharge environment identified: Yes  Barriers to discharge: No     Pt resting comfortably. VSS. AO. Pain controlled with oral pain meds. LLE pink, outlined, not going beyond marked areas. IVF infusing NS at 100ml/hr. Will continue to monitor.

## 2017-10-13 NOTE — PLAN OF CARE
"Problem: Patient Care Overview  Goal: Plan of Care/Patient Progress Review  Outcome: Improving  Problem: Patient Care Overview  Goal: Plan of Care/Patient Progress Review  Outcome: No Change  PRIMARY DIAGNOSIS: Right leg cellulitis.   OUTPATIENT/OBSERVATION GOALS TO BE MET BEFORE DISCHARGE:  1. ADLs back to baseline: Independent- mild pain with ambulation      2. Activity and level of assistance: Independent      3. Pain status: Improved-controlled with oral pain medications.      4. Return to near baseline physical activity: Yes      Discharge Planner Nurse   Safe discharge environment identified: Yes  Barriers to discharge: Not once medically cleared.      Vitals stable. Pt alert and oriented x4. Independent with cares. Reports 6/10 leg pain, tylenol given. LLE slightly red, outlined-improving. Pt reports the LLE redness and swelling \"is from my vein\". There are two separate round areas that are hard, swollen and painful to touch. IV rocephin infusing. Pt very eager to discharge. Will continue to monitor and provide supportive cares.    .       "

## 2018-01-27 ENCOUNTER — OFFICE VISIT (OUTPATIENT)
Dept: URGENT CARE | Facility: URGENT CARE | Age: 53
End: 2018-01-27
Payer: COMMERCIAL

## 2018-01-27 VITALS
DIASTOLIC BLOOD PRESSURE: 84 MMHG | BODY MASS INDEX: 26.77 KG/M2 | SYSTOLIC BLOOD PRESSURE: 122 MMHG | WEIGHT: 141.7 LBS | RESPIRATION RATE: 20 BRPM | HEART RATE: 72 BPM | OXYGEN SATURATION: 100 % | TEMPERATURE: 98 F

## 2018-01-27 DIAGNOSIS — M79.10 GENERALIZED MUSCLE ACHE: ICD-10-CM

## 2018-01-27 DIAGNOSIS — A08.4 VIRAL GASTROENTERITIS: Primary | ICD-10-CM

## 2018-01-27 LAB
FLUAV+FLUBV AG SPEC QL: NEGATIVE
FLUAV+FLUBV AG SPEC QL: NEGATIVE
SPECIMEN SOURCE: NORMAL

## 2018-01-27 PROCEDURE — 87804 INFLUENZA ASSAY W/OPTIC: CPT | Performed by: PHYSICIAN ASSISTANT

## 2018-01-27 PROCEDURE — 99203 OFFICE O/P NEW LOW 30 MIN: CPT | Performed by: PHYSICIAN ASSISTANT

## 2018-01-27 RX ORDER — ONDANSETRON 4 MG/1
4 TABLET, FILM COATED ORAL EVERY 8 HOURS PRN
Qty: 18 TABLET | Refills: 0 | Status: SHIPPED | OUTPATIENT
Start: 2018-01-27

## 2018-01-27 NOTE — PROGRESS NOTES
SUBJECTIVE:  Chief Complaint   Patient presents with     URI     fever/chills, abdominal pain, diarrhea, vomiting, muscle aches x 3 days     Karime Ortiz is a 52 year old female whose symptoms began 3 days ago and include   1) started with diarrhea and vomiting 3 days ago.  First day she had 2 episodes of vomiting and 2 episodes or diarrhea.    No blood in diarrhea or emesis.    The second day she had about 10 loose stools and 10 episodes of vomiting, no blood in either.    Abdominal cramping resolves with vomiting or diarrhea.    Subjective fevers.    Symptoms improved yesterday, so she was able to eat.  Unfortunately she vomited shortly after eating and again today she vomited x 4 with diarrhea x 3.    Her grandchildren have similar symptoms.        Risk factors: sick contacts    FH: denies any significant FH    Past Medical History:   Diagnosis Date     Anxiety      Thyroid disease    .  Current Outpatient Prescriptions   Medication Sig Dispense Refill     HYDROCHLOROTHIAZIDE PO Take 25 mg by mouth daily       Escitalopram Oxalate (LEXAPRO PO) Take 20 mg by mouth daily       LEVOTHYROXINE SODIUM PO Take 75 mcg by mouth daily        clindamycin (CLEOCIN) 300 MG capsule Take 1 capsule (300 mg) by mouth 4 times daily (Patient not taking: Reported on 1/27/2018) 28 capsule 0     multivitamin, therapeutic (THERA-VIT) TABS tablet Take 1 tablet by mouth daily as needed       ASPIRIN PO Take 325 mg by mouth daily as needed for moderate pain       Social History   Substance Use Topics     Smoking status: Never Smoker     Smokeless tobacco: Never Used     Alcohol use No       ROS:  CONSTITUTIONAL:as per HPI  INTEGUMENTARY/SKIN: NEGATIVE for worrisome rashes, moles or lesions  EYES: NEGATIVE for vision changes or irritation  ENT/MOUTH: NEGATIVE for ear, mouth and throat problems  RESP:NEGATIVE for significant cough or SOB  CV: NEGATIVE for chest pain, palpitations or peripheral edema  GI: as per HPI  : as per  HPI  MUSCULOSKELETAL: NEGATIVE for significant arthralgias or myalgia  NEURO: NEGATIVE for weakness, dizziness or paresthesias    OBJECTIVE:  /84 (BP Location: Left arm, Patient Position: Sitting, Cuff Size: Adult Regular)  Pulse 72  Temp 98  F (36.7  C) (Tympanic)  Resp 20  Wt 141 lb 11.2 oz (64.3 kg)  SpO2 100%  BMI 26.77 kg/m2  GENERAL APPEARANCE: healthy, alert and no distress  EYES: EOMI,  PERRL, conjunctiva clear  HENT: ear canals and TM's normal.  Nose and mouth without ulcers, erythema or lesions  NECK: supple, nontender, no lymphadenopathy  RESP: lungs clear to auscultation - no rales, rhonchi or wheezes  CV: regular rates and rhythm, normal S1 S2, no murmur noted  ABDOMEN:  soft, nontender, no HSM or masses and bowel sounds normal  NEURO: Normal strength and tone, sensory exam grossly normal,  normal speech and mentation  SKIN: no suspicious lesions or rashes    (A08.4) Viral gastroenteritis  (primary encounter diagnosis)  Comment:   Plan: ondansetron (ZOFRAN) 4 MG tablet            (M79.1) Generalized muscle ache  Comment:   Plan: Influenza A/B antigen, tylenol prn.      F/U with PCP should symptoms persist, to ED should symptoms worsen.    Patient expresses understanding and agreement with the assessment and plan as above.

## 2018-01-27 NOTE — MR AVS SNAPSHOT
After Visit Summary   1/27/2018    Karime Ortiz    MRN: [id number]           Patient Information     Date Of Birth          1965        Visit Information        Provider Department      1/27/2018 1:55 PM Ewelina Conde PA-C [facility]        Today's Diagnoses     Viral gastroenteritis    -  1    Generalized muscle ache          Care Instructions      Viral Gastroenteritis (Adult)    Gastroenteritis is commonly called the stomach flu. It is most often caused by a virus that affects the stomach and intestinal tract and usually lasts from 2 to 7 days. Common viruses causing gastroenteritis include norovirus, rotavirus, and hepatitis A. Non-viral causes of gastroenteritis include bacteria, parasites, and toxins.  The danger from repeated vomiting or diarrhea is dehydration. This is the loss of too much fluid from the body. When this occurs, body fluids must be replaced. Antibiotics do not help with this illness because it is usually viral.Simple home treatment will be helpful.  Symptoms of viral gastroenteritis may include:    Watery, loose stools    Stomach pain or abdominal cramps    Fever and chills    Nausea and vomiting    Loss of bowel control    Headache  Home care  Gastroenteritis is transmitted by contact with the stool or vomit of an infected person. This can occur from person to person or from contact with a contaminated surface.  Follow these guidelines when caring for yourself at home:    If symptoms are severe, rest at home for the next 24 hours or until you are feeling better.    Wash your hands with soap and water or use alcohol-based  to prevent the spread of infection. Wash your hands after touching anyone who is sick.    Wash your hands or use alcohol-based  after using the toilet and before meals. Clean the toilet after each use.  Remember these tips when preparing food:    People with diarrhea should not prepare or serve  food to others. When preparing foods, wash your hands before and after.    Wash your hands after using cutting boards, countertops, knives, or utensils that have been in contact with raw food.    Keep uncooked meats away from cooked and ready-to-eat foods.  Medicine  You may use acetaminophen or NSAID medicines like ibuprofen or naproxen to control fever unless another medicine was given. If you have chronic liver or kidney disease, talk with your healthcare provider before using these medicines. Also talk with your provider if you've had a stomach ulcer or gastrointestinal bleeding. Don't give aspirin to anyone under 18 years of age who is ill with a fever. It may cause severe liver damage. Don't use NSAIDS is you are already taking one for another condition (like arthritis) or are on aspirin (such as for heart disease or after a stroke).  If medicine for vomiting or diarrhea are prescribed, take these only as directed. Do not take over-the-counter medicines for vomiting or diarrhea unless instructed by your healthcare provider.  Diet  Follow these guidelines for food:    Water and liquids are important so you don't get dehydrated. Drink a small amount at a time or suck on ice chips if you are vomiting.    If you eat, avoid fatty, greasy, spicy, or fried foods.    Don't eat dairy if you have diarrhea. This can make diarrhea worse.    Avoid tobacco, alcohol, and caffeine which may worsen symptoms.  During the first 24 hours (the first full day), follow the diet below:    Beverages. Sports drinks, soft drinks without caffeine, ginger ale, mineral water (plain or flavored), decaffeinated tea and coffee. If you are very dehydrated, sports drinks aren't a good choice. They have too much sugar and not enough electrolytes. In this case, commercially available products called oral rehydration solutions, are best.    Soups. Eat clear broth, consommé, and bouillon.    Desserts. Eat gelatin, popsicles, and fruit juice  bars.  During the next 24 hours (the second day), you may add the following to the above:    Hot cereal, plain toast, bread, rolls, and crackers    Plain noodles, rice, mashed potatoes, chicken noodle or rice soup    Unsweetened canned fruit (avoid pineapple), bananas    Limit fat intake to less than 15 grams per day. Do this by avoiding margarine, butter, oils, mayonnaise, sauces, gravies, fried foods, peanut butter, meat, poultry, and fish.    Limit fiber and avoid raw or cooked vegetables, fresh fruits (except bananas), and bran cereals.    Limit caffeine and chocolate. Don't use spices or seasonings other than salt.    Limit dairy products.    Avoid alcohol.  During the next 24 hours:    Gradually resume a normal diet as you feel better and your symptoms improve.    If at any time it starts getting worse again, go back to clear liquids until you feel better.  Follow-up care  Follow up with your healthcare provider, or as advised. Call your provider if you don't get better within 24 hours or if diarrhea lasts more than a week. Also follow up if you are unable to keep down liquids and get dehydrated. If a stool (diarrhea) sample was taken, call as directed for the results.  Call 911  Call 911 if any of these occur:    Trouble breathing    Chest pain    Confused    Severe drowsiness or trouble awakening    Fainting or loss of consciousness    Rapid heart rate    Seizure    Stiff neck  When to seek medical advice  Call your healthcare provider right away if any of these occur:    Abdominal pain that gets worse    Continued vomiting (unable to keep liquids down)    Frequent diarrhea (more than 5 times a day)    Blood in vomit or stool (black or red color)    Dark urine, reduced urine output, or extreme thirst    Weakness or dizziness    Drowsiness    Fever of 100.4 F (38 C) oral or higher that does not get better with fever medicine    New rash  Date Last Reviewed: 1/3/2016    8429-9711 [employer] [address]  "[place] [address]. All rights reserved. This information is not intended as a substitute for professional medical care. Always follow your healthcare professional's instructions.                Follow-ups after your visit        Who to contact     If you have questions or need follow up information about today's clinic visit or your schedule please contact [facility] directly at [phone].  Normal or non-critical lab and imaging results will be communicated to you by [employer], letter or phone within 4 business days after the clinic has received the results. If you do not hear from us within 7 days, please contact the clinic through [employer] or phone. If you have a critical or abnormal lab result, we will notify you by phone as soon as possible.  Submit refill requests through [employer] or call your pharmacy and they will forward the refill request to us. Please allow 3 business days for your refill to be completed.          Additional Information About Your Visit        [employer] Information     [employer] lets you send messages to your doctor, view your test results, renew your prescriptions, schedule appointments and more. To sign up, go to [email or web address] . Click on \"Log in\" on the left side of the screen, which will take you to the Welcome page. Then click on \"Sign up Now\" on the right side of the page.     You will be asked to enter the access code listed below, as well as some personal information. Please follow the directions to create your username and password.     Your access code is: [email or web address]  Expires: 2018  3:39 PM     Your access code will  in 90 days. If you need help or a new code, please call your [facility] clinic or [phone].        Care EveryWhere ID     This is your Care EveryWhere ID. This could be used by other organizations to access your [facility] medical records  [id number]        Your Vitals Were     Pulse Temperature " Respirations Pulse Oximetry BMI (Body Mass Index)       72 98  F (36.7  C) (Tympanic) 20 100% 26.77 kg/m2        Blood Pressure from Last 3 Encounters:   01/27/18 122/84   10/13/17 140/56   07/02/16 136/79    Weight from Last 3 Encounters:   01/27/18 141 lb 11.2 oz (64.3 kg)   10/13/17 146 lb 3.2 oz (66.3 kg)   07/02/16 150 lb (68 kg)              We Performed the Following     Influenza A/B antigen          Today's Medication Changes          These changes are accurate as of 1/27/18  3:39 PM.  If you have any questions, ask your nurse or doctor.               Start taking these medicines.        Dose/Directions    ondansetron 4 MG tablet   Commonly known as:  ZOFRAN   Used for:  Viral gastroenteritis   Started by:  Ewelina Conde PA-C        Dose:  4 mg   Take 1 tablet (4 mg) by mouth every 8 hours as needed   Quantity:  18 tablet   Refills:  0            Where to get your medicines      These medications were sent to [facility] [address] [place] [address] AT [facility] & [address] [address]     Phone:  [phone]     ondansetron 4 MG tablet                Primary Care Provider Office Phone # Fax #    [facility] [phone] [address] [facility] [address]        Equal Access to Services     JOYCE WALL AH: Hadii emma handyo Sosharron, waaxda luqadaha, qaybta kaalmada adeegyada, saba vargas ademichael mahan. So [facility] [phone].    ATENCIÓN: Si habla español, tiene a veliz disposición servicios gratuitos de asistencia lingüística. Llame al [phone].    We comply with applicable federal civil rights laws and [place] laws. We do not discriminate on the basis of race, color, national origin, age, disability, sex, sexual orientation, or gender identity.            Thank you!     Thank you for choosing [facility]  for your care. Our goal is always  to provide you with excellent care. Hearing back from our patients is one way we can continue to improve our services. Please take a few minutes to complete the written survey that you may receive in the mail after your visit with us. Thank you!             Your Updated Medication List - Protect others around you: Learn how to safely use, store and throw away your medicines at www.disposemymeds.org.          This list is accurate as of 1/27/18  3:39 PM.  Always use your most recent med list.                   Brand Name Dispense Instructions for use Diagnosis    ASPIRIN PO      Take 325 mg by mouth daily as needed for moderate pain        clindamycin 300 MG capsule    CLEOCIN    28 capsule    Take 1 capsule (300 mg) by mouth 4 times daily    Cellulitis of left lower extremity       HYDROCHLOROTHIAZIDE PO      Take 25 mg by mouth daily        LEVOTHYROXINE SODIUM PO      Take 75 mcg by mouth daily        LEXAPRO PO      Take 20 mg by mouth daily        multivitamin, therapeutic Tabs tablet      Take 1 tablet by mouth daily as needed        ondansetron 4 MG tablet    ZOFRAN    18 tablet    Take 1 tablet (4 mg) by mouth every 8 hours as needed    Viral gastroenteritis

## 2018-01-27 NOTE — NURSING NOTE
"Chief Complaint   Patient presents with     URI     fever/chills, abdominal pain, diarrhea, vomiting, muscle aches x 3 days       Initial /84 (BP Location: Left arm, Patient Position: Sitting, Cuff Size: Adult Regular)  Pulse 72  Temp 98  F (36.7  C) (Tympanic)  Resp 20  Wt 141 lb 11.2 oz (64.3 kg)  SpO2 100%  BMI 26.77 kg/m2 Estimated body mass index is 26.77 kg/(m^2) as calculated from the following:    Height as of 10/12/17: 5' 1\" (1.549 m).    Weight as of this encounter: 141 lb 11.2 oz (64.3 kg).  Medication Reconciliation: complete     Princess ERIC Lei CMA      "

## 2018-01-27 NOTE — PATIENT INSTRUCTIONS
Viral Gastroenteritis (Adult)    Gastroenteritis is commonly called the stomach flu. It is most often caused by a virus that affects the stomach and intestinal tract and usually lasts from 2 to 7 days. Common viruses causing gastroenteritis include norovirus, rotavirus, and hepatitis A. Non-viral causes of gastroenteritis include bacteria, parasites, and toxins.  The danger from repeated vomiting or diarrhea is dehydration. This is the loss of too much fluid from the body. When this occurs, body fluids must be replaced. Antibiotics do not help with this illness because it is usually viral.Simple home treatment will be helpful.  Symptoms of viral gastroenteritis may include:    Watery, loose stools    Stomach pain or abdominal cramps    Fever and chills    Nausea and vomiting    Loss of bowel control    Headache  Home care  Gastroenteritis is transmitted by contact with the stool or vomit of an infected person. This can occur from person to person or from contact with a contaminated surface.  Follow these guidelines when caring for yourself at home:    If symptoms are severe, rest at home for the next 24 hours or until you are feeling better.    Wash your hands with soap and water or use alcohol-based  to prevent the spread of infection. Wash your hands after touching anyone who is sick.    Wash your hands or use alcohol-based  after using the toilet and before meals. Clean the toilet after each use.  Remember these tips when preparing food:    People with diarrhea should not prepare or serve food to others. When preparing foods, wash your hands before and after.    Wash your hands after using cutting boards, countertops, knives, or utensils that have been in contact with raw food.    Keep uncooked meats away from cooked and ready-to-eat foods.  Medicine  You may use acetaminophen or NSAID medicines like ibuprofen or naproxen to control fever unless another medicine was given. If you have chronic  liver or kidney disease, talk with your healthcare provider before using these medicines. Also talk with your provider if you've had a stomach ulcer or gastrointestinal bleeding. Don't give aspirin to anyone under 18 years of age who is ill with a fever. It may cause severe liver damage. Don't use NSAIDS is you are already taking one for another condition (like arthritis) or are on aspirin (such as for heart disease or after a stroke).  If medicine for vomiting or diarrhea are prescribed, take these only as directed. Do not take over-the-counter medicines for vomiting or diarrhea unless instructed by your healthcare provider.  Diet  Follow these guidelines for food:    Water and liquids are important so you don't get dehydrated. Drink a small amount at a time or suck on ice chips if you are vomiting.    If you eat, avoid fatty, greasy, spicy, or fried foods.    Don't eat dairy if you have diarrhea. This can make diarrhea worse.    Avoid tobacco, alcohol, and caffeine which may worsen symptoms.  During the first 24 hours (the first full day), follow the diet below:    Beverages. Sports drinks, soft drinks without caffeine, ginger ale, mineral water (plain or flavored), decaffeinated tea and coffee. If you are very dehydrated, sports drinks aren't a good choice. They have too much sugar and not enough electrolytes. In this case, commercially available products called oral rehydration solutions, are best.    Soups. Eat clear broth, consommé, and bouillon.    Desserts. Eat gelatin, popsicles, and fruit juice bars.  During the next 24 hours (the second day), you may add the following to the above:    Hot cereal, plain toast, bread, rolls, and crackers    Plain noodles, rice, mashed potatoes, chicken noodle or rice soup    Unsweetened canned fruit (avoid pineapple), bananas    Limit fat intake to less than 15 grams per day. Do this by avoiding margarine, butter, oils, mayonnaise, sauces, gravies, fried foods, peanut  butter, meat, poultry, and fish.    Limit fiber and avoid raw or cooked vegetables, fresh fruits (except bananas), and bran cereals.    Limit caffeine and chocolate. Don't use spices or seasonings other than salt.    Limit dairy products.    Avoid alcohol.  During the next 24 hours:    Gradually resume a normal diet as you feel better and your symptoms improve.    If at any time it starts getting worse again, go back to clear liquids until you feel better.  Follow-up care  Follow up with your healthcare provider, or as advised. Call your provider if you don't get better within 24 hours or if diarrhea lasts more than a week. Also follow up if you are unable to keep down liquids and get dehydrated. If a stool (diarrhea) sample was taken, call as directed for the results.  Call 911  Call 911 if any of these occur:    Trouble breathing    Chest pain    Confused    Severe drowsiness or trouble awakening    Fainting or loss of consciousness    Rapid heart rate    Seizure    Stiff neck  When to seek medical advice  Call your healthcare provider right away if any of these occur:    Abdominal pain that gets worse    Continued vomiting (unable to keep liquids down)    Frequent diarrhea (more than 5 times a day)    Blood in vomit or stool (black or red color)    Dark urine, reduced urine output, or extreme thirst    Weakness or dizziness    Drowsiness    Fever of 100.4 F (38 C) oral or higher that does not get better with fever medicine    New rash  Date Last Reviewed: 1/3/2016    4495-4724 [employer]. [address] [place] [address], [place] [address]. All rights reserved. This information is not intended as a substitute for professional medical care. Always follow your healthcare professional's instructions.

## 2019-03-26 ENCOUNTER — HOSPITAL ENCOUNTER (EMERGENCY)
Facility: CLINIC | Age: 54
Discharge: HOME | End: 2019-03-26
Attending: EMERGENCY MEDICINE | Admitting: EMERGENCY MEDICINE
Payer: COMMERCIAL

## 2019-03-26 ENCOUNTER — APPOINTMENT (OUTPATIENT)
Dept: GENERAL RADIOLOGY | Facility: CLINIC | Age: 54
End: 2019-03-26
Attending: EMERGENCY MEDICINE
Payer: COMMERCIAL

## 2019-03-26 VITALS
DIASTOLIC BLOOD PRESSURE: 90 MMHG | TEMPERATURE: 98.2 F | HEART RATE: 59 BPM | WEIGHT: 150 LBS | SYSTOLIC BLOOD PRESSURE: 152 MMHG | HEIGHT: 62 IN | OXYGEN SATURATION: 99 % | BODY MASS INDEX: 27.6 KG/M2 | RESPIRATION RATE: 27 BRPM

## 2019-03-26 DIAGNOSIS — R06.02 SHORTNESS OF BREATH: ICD-10-CM

## 2019-03-26 DIAGNOSIS — R07.9 CHEST PAIN, UNSPECIFIED TYPE: ICD-10-CM

## 2019-03-26 LAB
ALBUMIN SERPL-MCNC: 3.4 G/DL (ref 3.4–5)
ALP SERPL-CCNC: 46 U/L (ref 40–150)
ALT SERPL W P-5'-P-CCNC: 31 U/L (ref 0–50)
ANION GAP SERPL CALCULATED.3IONS-SCNC: 6 MMOL/L (ref 3–14)
AST SERPL W P-5'-P-CCNC: 26 U/L (ref 0–45)
BASOPHILS # BLD AUTO: 0 10E9/L (ref 0–0.2)
BASOPHILS NFR BLD AUTO: 0.2 %
BILIRUB SERPL-MCNC: 0.2 MG/DL (ref 0.2–1.3)
BUN SERPL-MCNC: 8 MG/DL (ref 7–30)
CALCIUM SERPL-MCNC: 8.7 MG/DL (ref 8.5–10.1)
CHLORIDE SERPL-SCNC: 106 MMOL/L (ref 94–109)
CO2 SERPL-SCNC: 31 MMOL/L (ref 20–32)
CREAT SERPL-MCNC: 0.56 MG/DL (ref 0.52–1.04)
D DIMER PPP FEU-MCNC: 0.3 UG/ML FEU (ref 0–0.5)
DIFFERENTIAL METHOD BLD: NORMAL
EOSINOPHIL # BLD AUTO: 0 10E9/L (ref 0–0.7)
EOSINOPHIL NFR BLD AUTO: 0.7 %
ERYTHROCYTE [DISTWIDTH] IN BLOOD BY AUTOMATED COUNT: 13.1 % (ref 10–15)
GFR SERPL CREATININE-BSD FRML MDRD: >90 ML/MIN/{1.73_M2}
GLUCOSE SERPL-MCNC: 101 MG/DL (ref 70–99)
HCT VFR BLD AUTO: 36 % (ref 35–47)
HGB BLD-MCNC: 11.8 G/DL (ref 11.7–15.7)
IMM GRANULOCYTES # BLD: 0 10E9/L (ref 0–0.4)
IMM GRANULOCYTES NFR BLD: 0.2 %
INTERPRETATION ECG - MUSE: NORMAL
LYMPHOCYTES # BLD AUTO: 1.4 10E9/L (ref 0.8–5.3)
LYMPHOCYTES NFR BLD AUTO: 35.3 %
MCH RBC QN AUTO: 29.7 PG (ref 26.5–33)
MCHC RBC AUTO-ENTMCNC: 32.8 G/DL (ref 31.5–36.5)
MCV RBC AUTO: 91 FL (ref 78–100)
MONOCYTES # BLD AUTO: 0.8 10E9/L (ref 0–1.3)
MONOCYTES NFR BLD AUTO: 18.7 %
NEUTROPHILS # BLD AUTO: 1.8 10E9/L (ref 1.6–8.3)
NEUTROPHILS NFR BLD AUTO: 44.9 %
NRBC # BLD AUTO: 0 10*3/UL
NRBC BLD AUTO-RTO: 0 /100
NT-PROBNP SERPL-MCNC: 16 PG/ML (ref 0–900)
PLATELET # BLD AUTO: 206 10E9/L (ref 150–450)
POTASSIUM SERPL-SCNC: 3.5 MMOL/L (ref 3.4–5.3)
PROT SERPL-MCNC: 7.1 G/DL (ref 6.8–8.8)
RBC # BLD AUTO: 3.97 10E12/L (ref 3.8–5.2)
SODIUM SERPL-SCNC: 143 MMOL/L (ref 133–144)
TROPONIN I SERPL-MCNC: <0.015 UG/L (ref 0–0.04)
WBC # BLD AUTO: 4 10E9/L (ref 4–11)

## 2019-03-26 PROCEDURE — 85025 COMPLETE CBC W/AUTO DIFF WBC: CPT | Performed by: EMERGENCY MEDICINE

## 2019-03-26 PROCEDURE — 71046 X-RAY EXAM CHEST 2 VIEWS: CPT

## 2019-03-26 PROCEDURE — 80053 COMPREHEN METABOLIC PANEL: CPT | Performed by: EMERGENCY MEDICINE

## 2019-03-26 PROCEDURE — 84484 ASSAY OF TROPONIN QUANT: CPT | Performed by: EMERGENCY MEDICINE

## 2019-03-26 PROCEDURE — 85379 FIBRIN DEGRADATION QUANT: CPT | Performed by: EMERGENCY MEDICINE

## 2019-03-26 PROCEDURE — 93005 ELECTROCARDIOGRAM TRACING: CPT

## 2019-03-26 PROCEDURE — 83880 ASSAY OF NATRIURETIC PEPTIDE: CPT | Performed by: EMERGENCY MEDICINE

## 2019-03-26 PROCEDURE — 99285 EMERGENCY DEPT VISIT HI MDM: CPT | Mod: 25

## 2019-03-26 ASSESSMENT — MIFFLIN-ST. JEOR: SCORE: 1228.65

## 2019-03-26 ASSESSMENT — ENCOUNTER SYMPTOMS
FEVER: 0
NAUSEA: 1
DIARRHEA: 1
PALPITATIONS: 0
SHORTNESS OF BREATH: 1
VOMITING: 1
COUGH: 1

## 2019-03-26 NOTE — ED AVS SNAPSHOT
Emergency Department  [address] [place] [address]  Phone:  [phone]  Fax:  [phone]                                    Karime Ortiz   MRN: [id number]    Department:   Emergency Department   Date of Visit:  3/26/2019           After Visit Summary Signature Page    I have received my discharge instructions, and my questions have been answered. I have discussed any challenges I see with this plan with the nurse or doctor.    ..........................................................................................................................................  Patient/Patient Representative Signature      ..........................................................................................................................................  Patient Representative Print Name and Relationship to Patient    ..................................................               ................................................  Date                                   Time    ..........................................................................................................................................  Reviewed by Signature/Title    ...................................................              ..............................................  Date                                               Time          22EPIC Rev 08/18

## 2019-03-27 NOTE — ED NOTES
Ambulated down pacheco and back with SpO2 monitor. Heart rate stayed at 85 and SpO2 % was 95-96% during the duration of the ambulation trial .

## 2019-03-27 NOTE — ED PROVIDER NOTES
"  History     Chief Complaint:  Shortness of Breath and Chest Pressure    HPI   Karime Ortiz is a 54 year old female with a history of anxiety and thyroid disease who presents to the ED for evaluation of shortness of breath. The patient reports that she was laying down to take a nap this evening, when her  noted she she developed an onset of shortness of breath that lasted a few seconds. She additionally had a lot of phlegm and started coughing in her sleep, so her  woke her up and called EMS to bring her to the ED. Here in the ED, the patient notes she now has some chest pressure she rates a 2/10. She adds that she has had nausea, vomiting, and diarrhea for the past 3 days. She denies any palpitations, fever, or other symptoms or concerns.    Allergies:  NKDA    Medications:    Aspirin  Lexapro  Hydrochlorothiazide  Levothyroxine    Past Medical History:    Anxiety  Thyroid disease  Cellulitis    Past Surgical History:    Gyn surgery    Family History:    No past pertinent family history.    Social History:  Marital Status:   [2]  Negative for tobacco use.  Occasional alcohol use  Negative for drug use.     Review of Systems   Constitutional: Negative for fever.   Respiratory: Positive for cough and shortness of breath.    Cardiovascular: Positive for chest pain. Negative for palpitations.   Gastrointestinal: Positive for diarrhea, nausea and vomiting.   All other systems reviewed and are negative.      Physical Exam     Patient Vitals for the past 24 hrs:   BP Temp Temp src Heart Rate Resp SpO2 Height Weight   03/26/19 1944 -- -- -- 59 27 98 % -- --   03/26/19 1920 148/88 98.2  F (36.8  C) Oral 60 16 99 % 1.575 m (5' 2\") 68 kg (150 lb)     Physical Exam  General: Alert and Interactive.   Head: No signs of trauma.   Mouth/Throat: Oropharynx is clear and moist.   Eyes: Conjunctivae are normal. Pupils are equal, round, and reactive to light.   Neck: Normal range of motion. No nuchal rigidity. "   CV: Normal rate and regular rhythm.    Resp: Effort normal and breath sounds normal. No respiratory distress.   GI: Soft. There is no tenderness or guarding.   MSK: Normal range of motion. no edema.   Neuro: The patient is alert and oriented to person, place, and time.  PERRLA, EOMI, strength in upper/lower extremities normal and symmetrical.   Sensation normal. Speech normal.  GCS eye subscore is 4. GCS verbal subscore is 5. GCS motor subscore is 6.   Skin: Skin is warm and dry. No rash noted.   Psych: normal mood and affect. behavior is normal.     Emergency Department Course   ECG:  Indication: Shortness of Breath  Time: 1936  Vent. Rate 59 bpm. NM interval 196. QRS duration 86. QT/QTc 404/399. P-R-T axis 58 43 47.  Sinus bradycardia with sinus arrhythmia. Otherwise normal ECG. No significant change compared to EKG dated 10/12/17. Read time: 1937    Imaging:  Radiographic findings were communicated with the patient who voiced understanding of the findings.  XR Chest 2 views:   Negative chest. Lungs clear, as per radiology.     Laboratory:  CBC: WBC: 4.0, HGB: 11.8, PLT: 206  CMP: Glucose 101 (H), o/w WNL (Creatinine: 0.56)    1930 Troponin: <0.015  D dimer: 0.3  BNP: 16    Emergency Department Course:  Nursing notes and vitals reviewed. (1931) I performed an exam of the patient as documented above.     Blood drawn. This was sent to the lab for further testing, results above.    The patient was sent for a Chest XR while in the emergency department, findings above.     EKG obtained in the ED, see results above.     (2031) I rechecked the patient and discussed the results of her workup thus far. The patient was road tested and her HR stayed at 83, and her SPO2 at 96%.    Findings and plan explained to the Patient. Patient discharged home with instructions regarding supportive care, medications, and reasons to return. The importance of close follow-up was reviewed.    I personally reviewed the laboratory results  with the Patient and answered all related questions prior to discharge.     Impression & Plan      Medical Decision Making:  Karime Ortiz is a 54 year old female who presents with chest pain and brief shortness of breath.  The work up in the Emergency Department is negative.  I considered a broad differential diagnosis in this patient including life-threatening etiologies such as acute coronary syndrome, myocardial infarction, pulmonary embolism, acute aortic dissection, myocarditis, pericarditis, acute valvular insufficiency amongst others.  Other causes considered for this patient included pneumonia, pneumothorax, chest wall source, pericarditis, pleurisy, esophageal spasm, etc.  No serious etiology for the chest pain or shortness of breath were detected today during this visit.  Close follow up with primary care is indicated should the pain continue, as further work up may be performed; this was made clear to the patient, who understands.     Diagnosis:    ICD-10-CM    1. Shortness of breath R06.02    2. Chest pain, unspecified type R07.9      Disposition:  discharged to home    Scribe Disclosure:  I, Radha Kennedy, am serving as a scribe on 3/26/2019 at 7:31 PM to personally document services performed by Surya Vinson MD based on my observations and the provider's statements to me.     Radha Kennedy  3/26/2019    EMERGENCY DEPARTMENT       Surya Vinson MD  03/27/19 0048

## 2019-03-27 NOTE — ED NOTES
Bed: ED19  Expected date:   Expected time:   Means of arrival:   Comments:  417  54 F chest discomfort/stomach flu  1912

## 2024-02-11 ENCOUNTER — HOSPITAL ENCOUNTER (EMERGENCY)
Facility: CLINIC | Age: 59
Discharge: HOME | End: 2024-02-11
Attending: EMERGENCY MEDICINE | Admitting: EMERGENCY MEDICINE
Payer: COMMERCIAL

## 2024-02-11 ENCOUNTER — APPOINTMENT (OUTPATIENT)
Dept: CT IMAGING | Facility: CLINIC | Age: 59
End: 2024-02-11
Attending: EMERGENCY MEDICINE
Payer: COMMERCIAL

## 2024-02-11 VITALS
HEART RATE: 63 BPM | TEMPERATURE: 98.5 F | RESPIRATION RATE: 20 BRPM | OXYGEN SATURATION: 98 % | BODY MASS INDEX: 30.91 KG/M2 | WEIGHT: 168 LBS | DIASTOLIC BLOOD PRESSURE: 70 MMHG | SYSTOLIC BLOOD PRESSURE: 129 MMHG | HEIGHT: 62 IN

## 2024-02-11 DIAGNOSIS — S02.2XXA CLOSED FRACTURE OF NASAL BONE, INITIAL ENCOUNTER: ICD-10-CM

## 2024-02-11 DIAGNOSIS — S01.21XA LACERATION OF NOSE, INITIAL ENCOUNTER: ICD-10-CM
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
## 2024-02-11 DIAGNOSIS — R55 SYNCOPE, UNSPECIFIED SYNCOPE TYPE: ICD-10-CM

## 2024-02-11 LAB
ALBUMIN SERPL BCG-MCNC: 4.1 G/DL (ref 3.5–5.2)
ALP SERPL-CCNC: 72 U/L (ref 40–150)
ALT SERPL W P-5'-P-CCNC: 29 U/L (ref 0–50)
ANION GAP SERPL CALCULATED.3IONS-SCNC: 8 MMOL/L (ref 7–15)
AST SERPL W P-5'-P-CCNC: 26 U/L (ref 0–45)
ATRIAL RATE - MUSE: 58 BPM
ATRIAL RATE - MUSE: 60 BPM
BASOPHILS # BLD AUTO: 0 10E3/UL (ref 0–0.2)
BASOPHILS NFR BLD AUTO: 0 %
BILIRUB SERPL-MCNC: 0.3 MG/DL
BUN SERPL-MCNC: 10.8 MG/DL (ref 8–23)
CALCIUM SERPL-MCNC: 8.6 MG/DL (ref 8.8–10.2)
CHLORIDE SERPL-SCNC: 106 MMOL/L (ref 98–107)
CREAT SERPL-MCNC: 0.56 MG/DL (ref 0.51–0.95)
DEPRECATED HCO3 PLAS-SCNC: 29 MMOL/L (ref 22–29)
DIASTOLIC BLOOD PRESSURE - MUSE: NORMAL MMHG
DIASTOLIC BLOOD PRESSURE - MUSE: NORMAL MMHG
EGFRCR SERPLBLD CKD-EPI 2021: >90 ML/MIN/1.73M2
EOSINOPHIL # BLD AUTO: 0.1 10E3/UL (ref 0–0.7)
EOSINOPHIL NFR BLD AUTO: 1 %
ERYTHROCYTE [DISTWIDTH] IN BLOOD BY AUTOMATED COUNT: 12.3 % (ref 10–15)
GLUCOSE SERPL-MCNC: 136 MG/DL (ref 70–99)
HCT VFR BLD AUTO: 38.6 % (ref 35–47)
HGB BLD-MCNC: 12.4 G/DL (ref 11.7–15.7)
IMM GRANULOCYTES # BLD: 0 10E3/UL
IMM GRANULOCYTES NFR BLD: 0 %
INTERPRETATION ECG - MUSE: NORMAL
INTERPRETATION ECG - MUSE: NORMAL
LYMPHOCYTES # BLD AUTO: 1.6 10E3/UL (ref 0.8–5.3)
LYMPHOCYTES NFR BLD AUTO: 22 %
MCH RBC QN AUTO: 29 PG (ref 26.5–33)
MCHC RBC AUTO-ENTMCNC: 32.1 G/DL (ref 31.5–36.5)
MCV RBC AUTO: 90 FL (ref 78–100)
MONOCYTES # BLD AUTO: 0.8 10E3/UL (ref 0–1.3)
MONOCYTES NFR BLD AUTO: 12 %
NEUTROPHILS # BLD AUTO: 4.8 10E3/UL (ref 1.6–8.3)
NEUTROPHILS NFR BLD AUTO: 65 %
NRBC # BLD AUTO: 0 10E3/UL
NRBC BLD AUTO-RTO: 0 /100
P AXIS - MUSE: 47 DEGREES
P AXIS - MUSE: 71 DEGREES
PLATELET # BLD AUTO: 181 10E3/UL (ref 150–450)
POTASSIUM SERPL-SCNC: 3.7 MMOL/L (ref 3.4–5.3)
PR INTERVAL - MUSE: 216 MS
PR INTERVAL - MUSE: 222 MS
PROT SERPL-MCNC: 7.2 G/DL (ref 6.4–8.3)
QRS DURATION - MUSE: 102 MS
QRS DURATION - MUSE: 102 MS
QT - MUSE: 422 MS
QT - MUSE: 428 MS
QTC - MUSE: 414 MS
QTC - MUSE: 428 MS
R AXIS - MUSE: 87 DEGREES
R AXIS - MUSE: 88 DEGREES
RBC # BLD AUTO: 4.28 10E6/UL (ref 3.8–5.2)
SODIUM SERPL-SCNC: 143 MMOL/L (ref 135–145)
SYSTOLIC BLOOD PRESSURE - MUSE: NORMAL MMHG
SYSTOLIC BLOOD PRESSURE - MUSE: NORMAL MMHG
T AXIS - MUSE: 50 DEGREES
T AXIS - MUSE: 60 DEGREES
TROPONIN T SERPL HS-MCNC: <6 NG/L
VENTRICULAR RATE- MUSE: 58 BPM
VENTRICULAR RATE- MUSE: 60 BPM
WBC # BLD AUTO: 7.3 10E3/UL (ref 4–11)

## 2024-02-11 PROCEDURE — 70450 CT HEAD/BRAIN W/O DYE: CPT

## 2024-02-11 PROCEDURE — 36415 COLL VENOUS BLD VENIPUNCTURE: CPT | Performed by: EMERGENCY MEDICINE

## 2024-02-11 PROCEDURE — 84484 ASSAY OF TROPONIN QUANT: CPT | Performed by: EMERGENCY MEDICINE

## 2024-02-11 PROCEDURE — 70486 CT MAXILLOFACIAL W/O DYE: CPT

## 2024-02-11 PROCEDURE — 72125 CT NECK SPINE W/O DYE: CPT

## 2024-02-11 PROCEDURE — 250N000013 HC RX MED GY IP 250 OP 250 PS 637: Performed by: EMERGENCY MEDICINE

## 2024-02-11 PROCEDURE — 80053 COMPREHEN METABOLIC PANEL: CPT | Performed by: EMERGENCY MEDICINE

## 2024-02-11 PROCEDURE — 99285 EMERGENCY DEPT VISIT HI MDM: CPT | Mod: 25

## 2024-02-11 PROCEDURE — 85025 COMPLETE CBC W/AUTO DIFF WBC: CPT | Performed by: EMERGENCY MEDICINE

## 2024-02-11 PROCEDURE — 93005 ELECTROCARDIOGRAM TRACING: CPT | Mod: XU

## 2024-02-11 PROCEDURE — 12011 RPR F/E/E/N/L/M 2.5 CM/<: CPT

## 2024-02-11 RX ORDER — ACETAMINOPHEN 325 MG/1
650 TABLET ORAL ONCE
Status: COMPLETED | OUTPATIENT
Start: 2024-02-11 | End: 2024-02-11

## 2024-02-11 RX ORDER — HYDROCODONE BITARTRATE AND ACETAMINOPHEN 5; 325 MG/1; MG/1
1 TABLET ORAL EVERY 6 HOURS PRN
Qty: 5 TABLET | Refills: 0 | Status: SHIPPED | OUTPATIENT
Start: 2024-02-11 | End: 2024-02-14

## 2024-02-11 RX ADMIN — ACETAMINOPHEN 650 MG: 325 TABLET, FILM COATED ORAL at 07:25

## 2024-02-11 ASSESSMENT — ACTIVITIES OF DAILY LIVING (ADL)
ADLS_ACUITY_SCORE: 35
ADLS_ACUITY_SCORE: 35
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
## 2024-02-11 NOTE — ED PROVIDER NOTES
"  History     Chief Complaint:  Dizziness, Fall, Laceration, and Headache       The history is provided by the patient.      Karime Ortiz is a 59 year old female with a history of hypertension who presents due to syncopal episode. The patient reports that she woke to use the bathroom. While in the bathroom she states that she became lightheaded and experienced an episode of syncope. She did hit the front of her face but is unsure what specifically she hit it on. This caused her to have a nosebleed which has since resolved. Currently, the patient reports a headache and slight neck pain. The patient is not anticoagulated.       Independent Historian:   None - Patient Only    Review of External Notes:   No notes reviewed        Medications:    Lexapro   Hydrochlorothiazide   Levothyroxine   Amlodipine   Lipitor     Past Medical History:    Anxiety   Depression   hypothyroid  Hypertension   Colon polyps     Past Surgical History:    Cyst removal   Tubal ligation   Teeth extraction   Hysterectomy   Venous ablation   Colonoscopy      Physical Exam   Patient Vitals for the past 24 hrs:   BP Temp Temp src Pulse Resp SpO2 Height Weight   02/11/24 0700 -- -- -- 75 20 96 % -- --   02/11/24 0630 124/73 -- -- 60 -- 98 % -- --   02/11/24 0530 (!) 146/83 -- -- 59 18 97 % -- --   02/11/24 0515 (!) 151/130 -- -- 57 16 94 % -- --   02/11/24 0500 134/75 -- -- 57 20 (!) 89 % -- --   02/11/24 0440 124/71 98.5  F (36.9  C) Temporal 59 16 96 % 1.575 m (5' 2\") 76.2 kg (168 lb)        Physical Exam  General: Resting on the gurney, appears  uncomfortable  Head:  No hematoma or laceration  Nose:  Considerable swelling from the nasal bridge. Minimal bleeding from the bridge of the nose.   Mouth/Throat: Mucus membranes are moist  Neck:  Minimal posterior neck tenderness to palpation.   CV:  Regular rate    Normal S1 and S2  No pathological murmur   Resp:  Breath sounds clear and equal bilaterally    Non-labored, no retractions or accessory " muscle use    No coarseness    No wheezing   GI:  Abdomen is soft, no rigidity    No tenderness to palpation  MS:  Normal motor assessment of all extremities.    Good capillary refill noted.    Moves all extremities equally with good strength.   Skin:  No rash or lesions noted. No large hematoma.  Neuro:   Speech is normal and fluent. No apparent deficit.  Psych: Awake. Alert.  Normal affect.      Appropriate interactions.    Emergency Department Course   ECG  ECG taken at 0448, ECG read at 0449  Sinus bradycardia with 1st degree AV block   Otherwise normal ECG    Rate 58 bpm. CA interval 216 ms. QRS duration 102 ms. QT/QTc 422/414 ms. P-R-T axes 47 88 60.     ECG  ECG taken at 0509, ECG read at 0510  Sinus rhythm with 1st degree AV block  Otherwise normal ECG   Rate 60 bpm. CA interval 222 ms. QRS duration 102 ms. QT/QTc 428/428 ms. P-R-T axes 71 87 50.     Imaging:  CT Head w/o Contrast   Final Result   IMPRESSION:   HEAD CT:   1.  No CT finding of a mass, hemorrhage or focal area suggestive of infarct.   2.  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      FACIAL BONE CT:   1.  Multiple nasal bone fractures with associated soft tissue swelling.      CERVICAL SPINE CT:   1.  No fracture or posttraumatic subluxation.   2.  No high-grade spinal canal or neural foraminal stenosis.      CT Cervical Spine w/o Contrast   Final Result   IMPRESSION:   HEAD CT:   1.  No CT finding of a mass, hemorrhage or focal area suggestive of infarct.   2.  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      FACIAL BONE CT:   1.  Multiple nasal bone fractures with associated soft tissue swelling.      CERVICAL SPINE CT:   1.  No fracture or posttraumatic subluxation.   2.  No high-grade spinal canal or neural foraminal stenosis.      CT Facial Bones without Contrast   Final Result   IMPRESSION:   HEAD CT:   1.  No CT finding of a mass, hemorrhage  or focal area suggestive of infarct.   2.  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      FACIAL BONE CT:   1.  Multiple nasal bone fractures with associated soft tissue swelling.      CERVICAL SPINE CT:   1.  No fracture or posttraumatic subluxation.   2.  No high-grade spinal canal or neural foraminal stenosis.      Report per radiology.      Laboratory:  Labs Ordered and Resulted from Time of ED Arrival to Time of ED Departure   COMPREHENSIVE METABOLIC PANEL - Abnormal       Result Value    Sodium 143      Potassium 3.7      Carbon Dioxide (CO2) 29      Anion Gap 8      Urea Nitrogen 10.8      Creatinine 0.56      GFR Estimate >90      Calcium 8.6 (*)     Chloride 106      Glucose 136 (*)     Alkaline Phosphatase 72      AST 26      ALT 29      Protein Total 7.2      Albumin 4.1      Bilirubin Total 0.3     TROPONIN T, HIGH SENSITIVITY - Normal    Troponin T, High Sensitivity <6     CBC WITH PLATELETS AND DIFFERENTIAL    WBC Count 7.3      RBC Count 4.28      Hemoglobin 12.4      Hematocrit 38.6      MCV 90      MCH 29.0      MCHC 32.1      RDW 12.3      Platelet Count 181      % Neutrophils 65      % Lymphocytes 22      % Monocytes 12      % Eosinophils 1      % Basophils 0      % Immature Granulocytes 0      NRBCs per 100 WBC 0      Absolute Neutrophils 4.8      Absolute Lymphocytes 1.6      Absolute Monocytes 0.8      Absolute Eosinophils 0.1      Absolute Basophils 0.0      Absolute Immature Granulocytes 0.0      Absolute NRBCs 0.0          Procedures     Laceration Repair      Procedure: Laceration Repair    Indication: Laceration    Consent: Verbal    Location: Nasal Bridge     Length: 2mm    Preparation: Irrigation with Sterile Saline.    Anesthesia/Sedation: None      Treatment/Exploration: Wound explored, no foreign bodies found     Closure: The wound was closed with Tissue Adhesive.    Patient Status: The patient tolerated the procedure well: Yes. There  were no complications.     Emergency Department Course & Assessments:         Assessments:  0457 I obtained history and examined the patient as noted above.   0700 I rechecked the patient and explained findings. The patient a 2mm laceration on the bridge of her nose that I repaired with glue. We discussed plan for discharge and patient is in agreement with plan.     Independent Interpretation (X-rays, CTs, rhythm strip):  I independently interpreted the patient's head CT images and noted no large volume intracranial hemorrhage.      Consultations/Discussion of Management or Tests:  0651 I spoke with Dr. Bower, radiology, regarding the patient's imaging results.    Disposition:  The patient was discharged.     Impression & Plan    CMS Diagnoses: None    Medical Decision Making:  Karime Ortiz is a 59 year old female who presents with a history and clinical exam consistent with syncope.  I considered a broad differential for their syncope today including cardiac arrythmia, ACS, aortic stenosis,  PE, orthostatic hypotension, drugs, situational, carotid hypersensitivity, seizure, TIA, stroke, vasovagal. Labs and EKG unremarkable and pt steady on her feet.  Possible vagal reaction due to using the bathroom, though etiology is not certain.       There are no signs of a concerning etiology for syncope at this point.  In addition, there is no family history of sudden death, no chest pain, no seizure activity or post-ictal period, no murmur, and no signs of orthostasis in the ED, no focal neurologic symptoms, and no complaints of concerning headache. Imaging was obtained of the head that revealed multiple nasal fractures. She will follow up with ENT in the nxt 3 days for evaluation.     The rest of the work up in the ED is negative and the physical exam is re-assurring.  Supportive outpatient management is therefore indicated.      Diagnosis:    ICD-10-CM    1. Syncope, unspecified syncope type  R55          Lisha  Disclosure:  I, Ebonie Kumar, am serving as a scribe at 5:33 AM on 2/11/2024 to document services personally performed by Shy Gann MD based on my observations and the provider's statements to me.     2/11/2024   Shy Gann MD Taxman, Karah M, MD  02/11/24 0756

## 2024-02-11 NOTE — ED NOTES
Bed: ED11  Expected date:   Expected time:   Means of arrival:   Comments:  448 59F syncope episode, possible nose fracture

## 2024-02-11 NOTE — ED TRIAGE NOTES
"BIBA from home. Unwitnessed fall. Per EMS, Pt got up at 04:00 am and ambulated to the bathroom and had a bowel movement. When she got up, she became dizzy, \"passed out\" and fell hit her nose on \" something\" in the bathroom in the bathroom and landed on the tile floor on her rt side. She sustained a Laceration on the nasal bridge and bleeding came from bilateral nostril.  Pt  ran to the bathroom and got Pt up from the bathroom floor. EMS called, upon their arrival,pt denied neck pain,c/o nausea and nose pain. - 130 systolic with HR 50s to 70s. IV 18 G placed LAC, NS 500cc, Zofran 4 mg and Fentanyl 25 mcg administered. Hx  Not on thinners, HTN and hypothyroidism.     Per ED arrival, Pt A/O x 4, denied chest or neck pain. Old dry blood noted on rt side and face and bilateral nostrils. Pt c/o stuffy nose  and headache.      Triage Assessment (Adult)       Row Name 02/11/24 0448          Triage Assessment    Airway WDL WDL        Respiratory WDL    Respiratory WDL WDL        Skin Circulation/Temperature WDL    Skin Circulation/Temperature WDL WDL;X   Nasal bridge Laceration. Old dry blood noted on nose        Cardiac WDL    Cardiac WDL WDL        Peripheral/Neurovascular WDL    Peripheral Neurovascular WDL WDL;X        Cognitive/Neuro/Behavioral WDL    Cognitive/Neuro/Behavioral WDL WDL                     "

## 2024-10-02 ENCOUNTER — APPOINTMENT (OUTPATIENT)
Dept: GENERAL RADIOLOGY | Facility: CLINIC | Age: 59
End: 2024-10-02
Attending: EMERGENCY MEDICINE
Payer: COMMERCIAL

## 2024-10-02 ENCOUNTER — APPOINTMENT (OUTPATIENT)
Dept: ULTRASOUND IMAGING | Facility: CLINIC | Age: 59
End: 2024-10-02
Attending: EMERGENCY MEDICINE
Payer: COMMERCIAL

## 2024-10-02 ENCOUNTER — HOSPITAL ENCOUNTER (EMERGENCY)
Facility: CLINIC | Age: 59
Discharge: HOME | End: 2024-10-02
Attending: EMERGENCY MEDICINE | Admitting: EMERGENCY MEDICINE
Payer: COMMERCIAL

## 2024-10-02 VITALS
DIASTOLIC BLOOD PRESSURE: 86 MMHG | TEMPERATURE: 97.5 F | OXYGEN SATURATION: 98 % | RESPIRATION RATE: 18 BRPM | SYSTOLIC BLOOD PRESSURE: 144 MMHG | HEART RATE: 67 BPM

## 2024-10-02 DIAGNOSIS — M54.6 ACUTE LEFT-SIDED THORACIC BACK PAIN: ICD-10-CM

## 2024-10-02 DIAGNOSIS — I83.819 PAIN DUE TO VARICOSE VEINS OF LOWER EXTREMITY: ICD-10-CM

## 2024-10-02 LAB
ANION GAP SERPL CALCULATED.3IONS-SCNC: 9 MMOL/L (ref 7–15)
ATRIAL RATE - MUSE: 62 BPM
BASOPHILS # BLD AUTO: 0 10E3/UL (ref 0–0.2)
BASOPHILS NFR BLD AUTO: 0 %
BUN SERPL-MCNC: 8.8 MG/DL (ref 8–23)
CALCIUM SERPL-MCNC: 9.3 MG/DL (ref 8.8–10.4)
CHLORIDE SERPL-SCNC: 106 MMOL/L (ref 98–107)
CREAT SERPL-MCNC: 0.58 MG/DL (ref 0.51–0.95)
D DIMER PPP FEU-MCNC: 0.46 UG/ML FEU (ref 0–0.5)
DIASTOLIC BLOOD PRESSURE - MUSE: NORMAL MMHG
EGFRCR SERPLBLD CKD-EPI 2021: >90 ML/MIN/1.73M2
EOSINOPHIL # BLD AUTO: 0.1 10E3/UL (ref 0–0.7)
EOSINOPHIL NFR BLD AUTO: 1 %
ERYTHROCYTE [DISTWIDTH] IN BLOOD BY AUTOMATED COUNT: 12.6 % (ref 10–15)
GLUCOSE SERPL-MCNC: 106 MG/DL (ref 70–99)
HCO3 SERPL-SCNC: 26 MMOL/L (ref 22–29)
HCT VFR BLD AUTO: 39.4 % (ref 35–47)
HGB BLD-MCNC: 12.7 G/DL (ref 11.7–15.7)
HOLD SPECIMEN: NORMAL
IMM GRANULOCYTES # BLD: 0 10E3/UL
IMM GRANULOCYTES NFR BLD: 0 %
INTERPRETATION ECG - MUSE: NORMAL
LYMPHOCYTES # BLD AUTO: 1.4 10E3/UL (ref 0.8–5.3)
LYMPHOCYTES NFR BLD AUTO: 26 %
MCH RBC QN AUTO: 29.1 PG (ref 26.5–33)
MCHC RBC AUTO-ENTMCNC: 32.2 G/DL (ref 31.5–36.5)
MCV RBC AUTO: 90 FL (ref 78–100)
MONOCYTES # BLD AUTO: 0.7 10E3/UL (ref 0–1.3)
MONOCYTES NFR BLD AUTO: 13 %
NEUTROPHILS # BLD AUTO: 3.2 10E3/UL (ref 1.6–8.3)
NEUTROPHILS NFR BLD AUTO: 60 %
NRBC # BLD AUTO: 0 10E3/UL
NRBC BLD AUTO-RTO: 0 /100
P AXIS - MUSE: 36 DEGREES
PLATELET # BLD AUTO: 219 10E3/UL (ref 150–450)
POTASSIUM SERPL-SCNC: 4.3 MMOL/L (ref 3.4–5.3)
PR INTERVAL - MUSE: 172 MS
PROCALCITONIN SERPL IA-MCNC: 0.04 NG/ML
QRS DURATION - MUSE: 88 MS
QT - MUSE: 404 MS
QTC - MUSE: 410 MS
R AXIS - MUSE: 29 DEGREES
RBC # BLD AUTO: 4.36 10E6/UL (ref 3.8–5.2)
SODIUM SERPL-SCNC: 141 MMOL/L (ref 135–145)
SYSTOLIC BLOOD PRESSURE - MUSE: NORMAL MMHG
T AXIS - MUSE: 46 DEGREES
TROPONIN T SERPL HS-MCNC: <6 NG/L
VENTRICULAR RATE- MUSE: 62 BPM
WBC # BLD AUTO: 5.3 10E3/UL (ref 4–11)

## 2024-10-02 PROCEDURE — 93971 EXTREMITY STUDY: CPT | Mod: LT

## 2024-10-02 PROCEDURE — 87040 BLOOD CULTURE FOR BACTERIA: CPT | Performed by: EMERGENCY MEDICINE

## 2024-10-02 PROCEDURE — 85379 FIBRIN DEGRADATION QUANT: CPT | Performed by: EMERGENCY MEDICINE

## 2024-10-02 PROCEDURE — 71046 X-RAY EXAM CHEST 2 VIEWS: CPT

## 2024-10-02 PROCEDURE — 99285 EMERGENCY DEPT VISIT HI MDM: CPT | Mod: 25

## 2024-10-02 PROCEDURE — 85025 COMPLETE CBC W/AUTO DIFF WBC: CPT | Performed by: EMERGENCY MEDICINE

## 2024-10-02 PROCEDURE — 84484 ASSAY OF TROPONIN QUANT: CPT | Performed by: EMERGENCY MEDICINE

## 2024-10-02 PROCEDURE — 84145 PROCALCITONIN (PCT): CPT | Performed by: EMERGENCY MEDICINE

## 2024-10-02 PROCEDURE — 36415 COLL VENOUS BLD VENIPUNCTURE: CPT | Performed by: EMERGENCY MEDICINE

## 2024-10-02 PROCEDURE — 80048 BASIC METABOLIC PNL TOTAL CA: CPT | Performed by: EMERGENCY MEDICINE

## 2024-10-02 PROCEDURE — 93005 ELECTROCARDIOGRAM TRACING: CPT

## 2024-10-02 ASSESSMENT — COLUMBIA-SUICIDE SEVERITY RATING SCALE - C-SSRS
6. HAVE YOU EVER DONE ANYTHING, STARTED TO DO ANYTHING, OR PREPARED TO DO ANYTHING TO END YOUR LIFE?: NO
1. IN THE PAST MONTH, HAVE YOU WISHED YOU WERE DEAD OR WISHED YOU COULD GO TO SLEEP AND NOT WAKE UP?: NO
2. HAVE YOU ACTUALLY HAD ANY THOUGHTS OF KILLING YOURSELF IN THE PAST MONTH?: NO

## 2024-10-02 ASSESSMENT — ACTIVITIES OF DAILY LIVING (ADL)
ADLS_ACUITY_SCORE: 35

## 2024-10-02 NOTE — ED TRIAGE NOTES
Left lower leg pain starting yesterday. Pt reports pain got worse overnight. Pt also reports left shoulder pain.      Triage Assessment (Adult)       Row Name 10/02/24 1040          Triage Assessment    Airway WDL WDL        Respiratory WDL    Respiratory WDL WDL        Cognitive/Neuro/Behavioral WDL    Cognitive/Neuro/Behavioral WDL WDL

## 2024-10-02 NOTE — ED PROVIDER NOTES
"  Emergency Department Note      History of Present Illness     Chief Complaint   Leg Pain and Shoulder Pain      HPI   Karime Ortiz is a 59 year old female with history of varicose veins, hypertension, hyperlipidemia, and hypothyroidism who presents to the ED for evaluation of leg pain and shoulder pain. The patient reports she has had varicose veins for the past 10 to 13 years. For the past two days, Karime states she has had worsening left lower extremity swelling, tenderness, and pain. She has been wearing her compression socks but has stopped recently due to the pain and soreness in the left leg. She also notes a \"pulsing\" in the left calf. Endorses occasional shortness of breath and palpitations. Karime reports a \"sharp\" pain in her upper back that has been ongoing for the past 2 weeks. Pain is not worse with deep breathing. She did feel feverish yesterday but did not taker her temperature. Denies chest pain, melena or bloody stools, or hematemesis. She called the Nurse Triage line and was referred to the ED for further evaluation and work up.            Independent Historian   None    Review of External Notes   none    Past Medical History     Medical History and Problem List   Anxiety  Hypothyroidism  Cellulitis   Allergic rhinitis  Arthritis  Bilateral carpal tunnel syndrome   Colon polyps  MDD  Hypertension  Hyperlipidemia  Subacute thyroiditis   Varicose veins  Hypoxemia   Leiomyoma or uterus   Anemia  Dysmenorrhea   Varicella    Ovarian cyst     Medications   Ondansetron  Aspirin 325 mg   Escitalopram oxalate   Hydrochlorothiazide  Levothyroxine   Amlodpine   Atorvastatin     Surgical History   Cyst removal, neck  Tubal ligation  Teeth extractions  Laparoscopic supracervical hysterectomy  Venous ablation, left  Colonoscopy     Physical Exam     Patient Vitals for the past 24 hrs:   BP Temp Temp src Pulse Resp SpO2   10/02/24 1041 (!) 144/86 -- -- -- -- --   10/02/24 1039 -- 97.5  F (36.4  C) Temporal 67 " 18 98 %     Physical Exam  Nursing note and vitals reviewed.  Constitutional:  Appears well-developed and well-nourished.   HENT:   Head:    Atraumatic.   Mouth/Throat:   Oropharynx is clear and moist. No oropharyngeal exudate.   Eyes:    Pupils are equal, round, and reactive to light.   Neck:    Normal range of motion. Neck supple.      No tracheal deviation present. No thyromegaly present.   Cardiovascular:  Normal rate, regular rhythm, no murmur   Pulmonary/Chest: Breath sounds are clear and equal without wheezes or crackles.  Abdominal:   Soft. Bowel sounds are normal. Exhibits no distension and      no mass. There is no tenderness.      There is no rebound and no guarding.   Musculoskeletal:  Left leg exam: Large tender varicose veins from the ankle to the proximal thigh, primarily over the medial aspect of the leg. No redness, warmth, or skin induration. No palpable fluid collection. No lymphangitis. No open wounds. Good dorsalis pedis pulse. Sensation intact distally.   Right leg exam: Superficial small varicose veins.   Lymphadenopathy:  No cervical adenopathy.   Neurological:   Alert and oriented to person, place, and time.   Skin:    Skin is warm and dry. No rash noted. No pallor.      Diagnostics     Lab Results   Labs Ordered and Resulted from Time of ED Arrival to Time of ED Departure   BASIC METABOLIC PANEL - Abnormal       Result Value    Sodium 141      Potassium 4.3      Chloride 106      Carbon Dioxide (CO2) 26      Anion Gap 9      Urea Nitrogen 8.8      Creatinine 0.58      GFR Estimate >90      Calcium 9.3      Glucose 106 (*)    TROPONIN T, HIGH SENSITIVITY - Normal    Troponin T, High Sensitivity <6     PROCALCITONIN - Normal    Procalcitonin 0.04     D DIMER QUANTITATIVE - Normal    D-Dimer Quantitative 0.46     CBC WITH PLATELETS AND DIFFERENTIAL    WBC Count 5.3      RBC Count 4.36      Hemoglobin 12.7      Hematocrit 39.4      MCV 90      MCH 29.1      MCHC 32.2      RDW 12.6      Platelet  Count 219      % Neutrophils 60      % Lymphocytes 26      % Monocytes 13      % Eosinophils 1      % Basophils 0      % Immature Granulocytes 0      NRBCs per 100 WBC 0      Absolute Neutrophils 3.2      Absolute Lymphocytes 1.4      Absolute Monocytes 0.7      Absolute Eosinophils 0.1      Absolute Basophils 0.0      Absolute Immature Granulocytes 0.0      Absolute NRBCs 0.0     BLOOD CULTURE       Imaging   XR Chest 2 Views   Final Result   IMPRESSION:  There are no acute infiltrates. The cardiac silhouette is   not enlarged. Pulmonary vasculature is unremarkable.       OSIRIS BOLAÑOS MD            SYSTEM ID:  [id number]      US Lower Extremity Venous Duplex Left   Final Result   IMPRESSION: No evidence of deep venous thrombosis. Varicose veins as   above.      YOLY LÓPEZ MD            SYSTEM ID:  [id number]          EKG   ECG taken at 1047, ECG read at 1055  Normal sinus rhythm  Normal ECG   Rate 62 bpm. NH interval 172 ms. QRS duration 88 ms. QT/QTc 404/410 ms. P-R-T axes 36 29 46.    Independent Interpretation   CXR: No pneumothorax, infiltrate, pleural effusion, pulmonary edema, or cardiomegaly.    ED Course      Medications Administered   Medications - No data to display    Procedures   Procedures     Discussion of Management   None    ED Course   ED Course as of 10/02/24 1356   Wed Oct 02, 2024   1144 I obtained history and examined the patient as noted above.    1309 I rechecked the patient and explained findings.        Additional Documentation  None    Medical Decision Making / Diagnosis     CMS Diagnoses: None    MIPS       None    [facility]   Karime Ortiz is a 59 year old female who arrives to the emergency department due to left leg pain which I feel is due to her significant varicose veins.  Leg ultrasound was performed because I was initially concerned about the possibly of DVT, however hide ultrasound does not show any sign of DVT.  There was clinically no sign of celluliti, necrotizing fasciitis,  abscess and she has good dorsalis pedis pulse without sign of vascular compromise..  She has left scapular pain but her D-dimer is normal without sign of DVT.  Chest x-ray does not show any pneumothorax, effusion, pneumonia.  ECG does not show any sign of arrhythmia or ischemia and her troponin is negative.  Procalcitonin is negative without sign of infection.  This is likely musculoskeletal pain.  She was referred to follow-up with vascular surgery regarding her varicose veins and to continue using her compression stockings.  She was told to keep her legs elevated is much as possible.  She was told to return to the emergency department as needed for any worsening of symptoms or leg redness or fever.  She was told to follow-up with her primary care doctor in clinic this week.    Disposition   The patient was discharged.     Diagnosis     ICD-10-CM    1. Pain due to varicose veins of lower extremity  I83.819 Vascular Surgery Referral      2. Acute left-sided thoracic back pain  M54.6            Discharge Medications   New Prescriptions    No medications on file         Scribe Disclosure:  Geraldine MIRANDA, am serving as a scribe at 11:49 AM on 10/2/2024 to document services personally performed by Hien Alexander MD based on my observations and the provider's statements to me.        Hien Alexander MD  10/02/24 0111

## 2024-10-04 ENCOUNTER — OFFICE VISIT (OUTPATIENT)
Dept: VASCULAR SURGERY | Facility: CLINIC | Age: 59
End: 2024-10-04
Attending: EMERGENCY MEDICINE
Payer: COMMERCIAL

## 2024-10-04 DIAGNOSIS — I83.819 PAIN DUE TO VARICOSE VEINS OF LOWER EXTREMITY: Primary | ICD-10-CM

## 2024-10-04 DIAGNOSIS — M79.3 LIPODERMATOSCLEROSIS OF LEFT LOWER EXTREMITY: ICD-10-CM

## 2024-10-04 PROCEDURE — 99204 OFFICE O/P NEW MOD 45 MIN: CPT | Performed by: SURGERY

## 2024-10-04 PROCEDURE — G2211 COMPLEX E/M VISIT ADD ON: HCPCS | Performed by: SURGERY

## 2024-10-04 RX ORDER — FLUTICASONE PROPIONATE 50 MCG
2 SPRAY, SUSPENSION (ML) NASAL
COMMUNITY
Start: 2024-09-11

## 2024-10-04 RX ORDER — ATORVASTATIN CALCIUM 40 MG/1
40 TABLET, FILM COATED ORAL DAILY
COMMUNITY
Start: 2023-11-07

## 2024-10-04 RX ORDER — ALBUTEROL SULFATE 90 UG/1
2 INHALANT RESPIRATORY (INHALATION)
COMMUNITY
Start: 2024-07-30

## 2024-10-04 RX ORDER — AMLODIPINE BESYLATE 5 MG/1
1 TABLET ORAL
COMMUNITY
Start: 2024-08-02

## 2024-10-04 RX ORDER — CETIRIZINE HYDROCHLORIDE 10 MG/1
1 TABLET ORAL DAILY
COMMUNITY
Start: 2024-09-11

## 2024-10-04 NOTE — PROGRESS NOTES
VEINSOLUTIONS CONSULTATION    HPI:    Karime Ortiz is a pleasant 59 year old female referred by Dr. Hien Alexander for evaluation of severe left leg pain and lipodermatosclerosis.  She was recently seen at the [facility] emergency department and evaluated and found not to have a left lower extremity deep vein thrombosis.  I have reviewed her left lower extremity venous duplex ultrasound.    She was evaluated some 10 years ago  at [facility] but apparently could not get approved for vein treatment and never followed through with treatment.  She has been wearing compression hose for years, noting that she initially had significant swelling of her distal left leg  but now she has developed discoloration and a shrunken appearance of the distal leg.  The pain has become so severe, she is quite demoralized from it.    She has no history of deep vein thrombosis, hemorrhage or superficial thrombophlebitis.    Family history is significant for varicose veins in her son .  No venous thromboembolism.    PAST MEDICAL HISTORY:   Past Medical History:   Diagnosis Date    Anxiety     Thyroid disease        PAST SURGICAL HISTORY:   Past Surgical History:   Procedure Laterality Date    GYN SURGERY         FAMILY HISTORY: No family history on file.    SOCIAL HISTORY:   Social History     Tobacco Use    Smoking status: Never    Smokeless tobacco: Never   Substance Use Topics    Alcohol use: Yes     Comment: Occasionally       REVIEW OF SYSTEMS: Review Of Systems  Skin: Rash  Eyes: Negative  Ears/Nose/Throat: negative  Respiratory: No shortness of breath, dyspnea on exertion, cough, or hemoptysis  Cardiovascular: negative  Gastrointestinal: negative  Genitourinary: negative  Musculoskeletal: Leg pain, leg swelling  Neurologic: negative  Psychiatric: negative  Hematologic/Lymphatic/Immunologic: negative  Endocrine: Hypothyroidism.   -Continue home levothyroxine      Vital signs:  There were no vitals taken  for this visit.    Current Outpatient Medications   Medication Sig Dispense Refill    albuterol (PROAIR HFA/PROVENTIL HFA/VENTOLIN HFA) 108 (90 Base) MCG/ACT inhaler Inhale 2 puffs into the lungs.      amLODIPine (NORVASC) 5 MG tablet Take 1 tablet by mouth daily at 2 pm.      atorvastatin (LIPITOR) 40 MG tablet Take 40 mg by mouth daily.      cetirizine (ZYRTEC) 10 MG tablet Take 1 tablet by mouth daily.      fluticasone (FLONASE) 50 MCG/ACT nasal spray Spray 2 sprays in nostril.      ASPIRIN PO Take 325 mg by mouth daily as needed for moderate pain      clindamycin (CLEOCIN) 300 MG capsule Take 1 capsule (300 mg) by mouth 4 times daily (Patient not taking: Reported on 1/27/2018) 28 capsule 0    Escitalopram Oxalate (LEXAPRO PO) Take 20 mg by mouth daily      HYDROCHLOROTHIAZIDE PO Take 25 mg by mouth daily      LEVOTHYROXINE SODIUM PO Take 75 mcg by mouth daily       multivitamin, therapeutic (THERA-VIT) TABS tablet Take 1 tablet by mouth daily as needed      ondansetron (ZOFRAN) 4 MG tablet Take 1 tablet (4 mg) by mouth every 8 hours as needed 18 tablet 0       PHYSICAL EXAM:  General: Pleasant, NAD.   HEENT: Normocephalic, atraumatic, external ears and nose normal.   Respiratory: Normal respiratory effort.   Cardiovascular: Pulse is regular.   Musculoskeletal: Gait and station normal.  The joints of her fingers and toes without deformity.  There is no cyanosis of her nailbeds.   EXTREMITIES: Right lower extremity: 4 to 6 mm varicosities scattered over the right mid to distal medial thigh.  1+ edema of the right ankle.  No significant lipodermatosclerosis or stasis hyperpigmentation.    Left lower extremity: 8-10 mm varicosities over the left mid to distal medial thigh, extending onto the left medial calf.  Severe lipodermatosclerosis with a shrunken appearance of the distal third of the left leg anteromedially with intense stasis hyperpigmentation and tenderness in the area.  There is no significant erythema to  suggest cellulitis.  1-2+ edema of the foot distal to the lipodermatosclerosis and down the calf rash.    PULSES: R/L (3=normal pulse, 0=no palpable pulse) dorsalis pedis: 3/3; posterior tibial: 3/3.      Neurologic: Grossly normal  Psychiatric: Mood, affect, judgment and insight are normal     Venous Duplex Ultrasound:   VENOUS ULTRASOUND LEFT LEG  10/2/2024 12:48 PM      HISTORY: check for DVT or abscess/fluid collection--left leg swelling  and pain     COMPARISON: None.     FINDINGS:  Examination of the deep veins with graded compression and  color flow Doppler with spectral wave form analysis was performed.  No  evidence for DVT in the left lower extremity. Varicose veins in the  left calf in the area of pain. These are difficult to compress but  appear to be patent on the color Doppler imaging.                                                                      IMPRESSION: No evidence of deep venous thrombosis. Varicose veins as above.     YOLY LÓPEZ MD     ASSESSMENT:  Severe, chronic venous insufficiency left lower extremity with advanced lipodermatosclerosis and right leg varicose veins with swelling and pain.  We discussed lower extremity vein anatomy and the pathophysiology of venous insufficiency utilizing a leg vein drawing.    She has advanced lipodermatosclerosis of the left leg and needs a left lower extremity venous competency study.  Based on findings, she will likely need treatment of her superficial venous insufficiency with phlebectomies of the large varicose veins.  We discussed details of endovenous ablation including risks of bleeding, infection, nerve injury and deep vein thrombosis.  Both the patient and her  voiced understanding and her questions were answered.    PLAN:  Bilateral lower extremity venous competency study with virtual visit to discuss results    Carlos Orourke MD, FACS     Dictated using Dragon voice recognition software which may result in  transcription errors        VEIN CLINIC LEG DRAWING:

## 2024-10-04 NOTE — LETTER
10/4/2024      Karime Ortiz  [address] [address]      Dear Colleague,    Thank you for referring your patient, Karime Ortiz, to the [facility] [place]. Please see a copy of my visit note below.      VEINSOLUTIONS CONSULTATION    HPI:    Karime Ortiz is a pleasant 59 year old female referred by Dr. Hien Alexander for evaluation of severe left leg pain and lipodermatosclerosis.  She was recently seen at the [facility] emergency department and evaluated and found not to have a left lower extremity deep vein thrombosis.  I have reviewed her left lower extremity venous duplex ultrasound.    She was evaluated some 10 years ago  at [facility] but apparently could not get approved for vein treatment and never followed through with treatment.  She has been wearing compression hose for years, noting that she initially had significant swelling of her distal left leg  but now she has developed discoloration and a shrunken appearance of the distal leg.  The pain has become so severe, she is quite demoralized from it.    She has no history of deep vein thrombosis, hemorrhage or superficial thrombophlebitis.    Family history is significant for varicose veins in her son .  No venous thromboembolism.    PAST MEDICAL HISTORY:   Past Medical History:   Diagnosis Date     Anxiety      Thyroid disease        PAST SURGICAL HISTORY:   Past Surgical History:   Procedure Laterality Date     GYN SURGERY         FAMILY HISTORY: No family history on file.    SOCIAL HISTORY:   Social History     Tobacco Use     Smoking status: Never     Smokeless tobacco: Never   Substance Use Topics     Alcohol use: Yes     Comment: Occasionally       REVIEW OF SYSTEMS: Review Of Systems  Skin: Rash  Eyes: Negative  Ears/Nose/Throat: negative  Respiratory: No shortness of breath, dyspnea on exertion, cough, or hemoptysis  Cardiovascular: negative  Gastrointestinal: negative  Genitourinary:  negative  Musculoskeletal: Leg pain, leg swelling  Neurologic: negative  Psychiatric: negative  Hematologic/Lymphatic/Immunologic: negative  Endocrine: Hypothyroidism.   -Continue home levothyroxine      Vital signs:  There were no vitals taken for this visit.    Current Outpatient Medications   Medication Sig Dispense Refill     albuterol (PROAIR HFA/PROVENTIL HFA/VENTOLIN HFA) 108 (90 Base) MCG/ACT inhaler Inhale 2 puffs into the lungs.       amLODIPine (NORVASC) 5 MG tablet Take 1 tablet by mouth daily at 2 pm.       atorvastatin (LIPITOR) 40 MG tablet Take 40 mg by mouth daily.       cetirizine (ZYRTEC) 10 MG tablet Take 1 tablet by mouth daily.       fluticasone (FLONASE) 50 MCG/ACT nasal spray Spray 2 sprays in nostril.       ASPIRIN PO Take 325 mg by mouth daily as needed for moderate pain       clindamycin (CLEOCIN) 300 MG capsule Take 1 capsule (300 mg) by mouth 4 times daily (Patient not taking: Reported on 1/27/2018) 28 capsule 0     Escitalopram Oxalate (LEXAPRO PO) Take 20 mg by mouth daily       HYDROCHLOROTHIAZIDE PO Take 25 mg by mouth daily       LEVOTHYROXINE SODIUM PO Take 75 mcg by mouth daily        multivitamin, therapeutic (THERA-VIT) TABS tablet Take 1 tablet by mouth daily as needed       ondansetron (ZOFRAN) 4 MG tablet Take 1 tablet (4 mg) by mouth every 8 hours as needed 18 tablet 0       PHYSICAL EXAM:  General: Pleasant, NAD.   HEENT: Normocephalic, atraumatic, external ears and nose normal.   Respiratory: Normal respiratory effort.   Cardiovascular: Pulse is regular.   Musculoskeletal: Gait and station normal.  The joints of her fingers and toes without deformity.  There is no cyanosis of her nailbeds.   EXTREMITIES: Right lower extremity: 4 to 6 mm varicosities scattered over the right mid to distal medial thigh.  1+ edema of the right ankle.  No significant lipodermatosclerosis or stasis hyperpigmentation.    Left lower extremity: 8-10 mm varicosities over the left mid to distal  medial thigh, extending onto the left medial calf.  Severe lipodermatosclerosis with a shrunken appearance of the distal third of the left leg anteromedially with intense stasis hyperpigmentation and tenderness in the area.  There is no significant erythema to suggest cellulitis.  1-2+ edema of the foot distal to the lipodermatosclerosis and down the calf rash.    PULSES: R/L (3=normal pulse, 0=no palpable pulse) dorsalis pedis: 3/3; posterior tibial: 3/3.      Neurologic: Grossly normal  Psychiatric: Mood, affect, judgment and insight are normal     Venous Duplex Ultrasound:   VENOUS ULTRASOUND LEFT LEG  10/2/2024 12:48 PM      HISTORY: check for DVT or abscess/fluid collection--left leg swelling  and pain     COMPARISON: None.     FINDINGS:  Examination of the deep veins with graded compression and  color flow Doppler with spectral wave form analysis was performed.  No  evidence for DVT in the left lower extremity. Varicose veins in the  left calf in the area of pain. These are difficult to compress but  appear to be patent on the color Doppler imaging.                                                                      IMPRESSION: No evidence of deep venous thrombosis. Varicose veins as above.     YOLY LÓPEZ MD     ASSESSMENT:  Severe, chronic venous insufficiency left lower extremity with advanced lipodermatosclerosis and right leg varicose veins with swelling and pain.  We discussed lower extremity vein anatomy and the pathophysiology of venous insufficiency utilizing a leg vein drawing.    She has advanced lipodermatosclerosis of the left leg and needs a left lower extremity venous competency study.  Based on findings, she will likely need treatment of her superficial venous insufficiency with phlebectomies of the large varicose veins.  We discussed details of endovenous ablation including risks of bleeding, infection, nerve injury and deep vein thrombosis.  Both the patient and her  voiced  understanding and her questions were answered.    PLAN:  Bilateral lower extremity venous competency study with virtual visit to discuss results    Carlos Orourke MD, FACS     Dictated using Dragon voice recognition software which may result in transcription errors        VEIN CLINIC LEG DRAWING:                Again, thank you for allowing me to participate in the care of your patient.        Sincerely,        Carlos Orourke MD

## 2024-10-07 LAB — BACTERIA BLD CULT: NO GROWTH

## 2024-10-16 ENCOUNTER — ANCILLARY PROCEDURE (OUTPATIENT)
Dept: ULTRASOUND IMAGING | Facility: CLINIC | Age: 59
End: 2024-10-16
Attending: SURGERY
Payer: COMMERCIAL

## 2024-10-16 DIAGNOSIS — I83.819 PAIN DUE TO VARICOSE VEINS OF LOWER EXTREMITY: ICD-10-CM

## 2024-10-16 PROCEDURE — 93970 EXTREMITY STUDY: CPT | Performed by: SURGERY

## 2024-10-17 ENCOUNTER — VIRTUAL VISIT (OUTPATIENT)
Dept: VASCULAR SURGERY | Facility: CLINIC | Age: 59
End: 2024-10-17
Attending: SURGERY
Payer: COMMERCIAL

## 2024-10-17 DIAGNOSIS — I83.819 PAIN DUE TO VARICOSE VEINS OF LOWER EXTREMITY: Primary | ICD-10-CM

## 2024-10-17 DIAGNOSIS — M79.3 LIPODERMATOSCLEROSIS OF LEFT LOWER EXTREMITY: ICD-10-CM

## 2024-10-17 PROCEDURE — 99213 OFFICE O/P EST LOW 20 MIN: CPT | Mod: 95 | Performed by: SURGERY

## 2024-10-17 NOTE — PATIENT INSTRUCTIONS
Procedure Plan: Left leg VNUS closure of Accessory Saphenous Vein, and multiple phlebectomies.    We will submit this to your insurance and call you when we receive a response.    If it has been more than 3 weeks and you have not heard from our surgery scheduler, please call our office regarding the status of your insurance authorization.    Sussy Hinkle, Surgery Scheduler - [phone].     Thigh High, medical grade, 20-30 mmHg strength, compression stockings are required after the procedure (after the bandages come off at 48 hours).    Options for purchasing compression stockings:    Call your insurance company and ask if compression stockings are covered.  If they are covered, they usually fall under your Durable Medical Equipment (DME) coverage.  The DME codes if they ask are: Knee high , Thigh high , Panty .   Be sure to ask if you need a specific medical diagnosis for coverage, if your deductible needs to be met first, how many pairs are covered and how often you can get them.  If insurance covers them and you would like to order from [employer], or another medical supply company: contact the vein clinic and we will fax a prescription to your medical supply company of choice. They will bill your insurance and ship them to you. Please let us know your color choice (black or beige), and toe choice (open or closed toe) when contacting us.    2.   We sell regular sizes in our clinic (except specialty order or petite sizing). We cannot bill your insurance for these purchases.  Knee high (Mediven brand) are $60/pair  Thigh high (Mediven brand) are $85/pair.   If you would like to purchase from the clinic, let us know including your color choice (black or beige), and toe choice (open or closed toe), and we will set them aside for you to  and pay for at your next clinic appointment or the day of your procedure.    Online website ordering options:    [email or web address]  [email or web address]  [email or web address]  [email or web address] has many options available.     Your Measurements:   Right Ankle: 22cm   Right Calf: 41cm   Right Thigh: 57.5cm     Left Ankle: 24.5cm   Left Calf: 42.5cm   Left Thigh: 59cm     Leg Length: 75.5cm   Calf Length: 42.5cm          Pre-Procedure Instructions:                           VNUS Closure and Phlebectomies   You are having a Closure(s) - where one or more veins are closed and Phlebectomies - where one or more veins are removed.   Insurance  Precertification and/or referral authorization may be required by your insurance company.  We will call your insurance company to verify benefits for the medically necessary part of your procedure.    Your Current Medications and Allergies  To reduce bruising, please do not take aspirin-type medications (Motrin, Aleve, Ibuprofen, Advil, etc.) for three days before your procedure. You may take Tylenol if you need a pain reliever.  Are you on blood thinner medications? (Plavix, Coumadin, Eliquis, Xarelto) Please discuss this with your surgeon. You may resume taking your blood thinner medication after your procedure.  Are you sensitive to latex or adhesives used for fake fingernails? Please let us know!    Driving Escort and   Please arrange to have a trusted adult (18 years old or older) drive you to and from the clinic.  For your safety, we recommend you have a trusted adult to stay with you until the next morning.    Your Health  If you have a change in your health before the procedure, contact our office immediately. (For example: cold symptoms, cough, urinary tract infection, fever, flu symptoms.)  A pre-procedure physical is not required.    Note  It is sometimes necessary to adjust the procedure schedule due to emergencies. We greatly appreciate your flexibility and understanding in this matter.                  Check List: The Morning of Your Procedure  ___1. Please do not put anything on your  leg(s) or shave the day of your procedure.  ___2. You may take your normal medications the day of your procedure.  ___3. It is recommended you eat a light breakfast or lunch the day of your procedure.  ___4. Wear comfortable loose-fitting clothing and wide-fitting shoes (i.e. tennis shoes, slip-ons).  ___5. Please arrive at our clinic at the specified time given by the nurse.  ___6. You will sign an affirmation of informed consent.  ___7. Bring your pre-procedure sedation medication (lorazepam and clonidine) with you to the clinic.              One hour before your procedure, you will be instructed to take these medications. The lorazepam              (Ativan) lowers anxiety and sedates you; the clonidine makes the lorazepam more effective. Everyone's              body processes these medications differently. Therefore, reactions to these medications vary. Some              people stay awake and some people sleep through the whole procedure. You may not remember              everything about the procedure or the day. You do not want to make any big decisions for the rest of the              day.    The Day of Your Procedure:       VNUS Closure and Phlebectomies  In the Exam Room  A nurse will bring you back to an exam room with your family member or friend. This is when your informed consent will be signed, and you will take your pre-procedure medications.  You will be asked to remove everything from the waist down, including undergarments. You will then put on a hospital gown or shorts and blue booties.  Your surgeon will come in to answer any questions and corby any bulging varicose veins to be removed.  You will be taken to the restroom to empty your bladder before going into the procedure room.    In the Procedure Room  You will be escorted to the procedure room. You will lie on a procedure table covered with a sheet or blanket.  A nurse will put a blood pressure cuff on your arm and a pulse/oxygen monitor on a  finger. Your vital signs will be monitored every 15 minutes.  Your gown will be pulled up slightly and the groin exposed for a short period of time. The surgeon's assistant will clean your foot, leg, and groin with an antibacterial solution. We will get you covered up as quickly as possible!  Sterile towels and blue drapes will be used to cover you and the table. You will be asked to keep your hands under the blue drapes during the procedure.  The lights will be turned down. The table will be tipped so your head is higher than your feet. You may feel like you're going to slide off, but you won't.    The Procedure  The surgeon will visualize your veins with an ultrasound machine. He or she will then numb your skin and access the vein. A catheter is passed up the vein and positioned with ultrasound guidance. The table will then be tipped head down.  Once the catheter is in the correct position, medication will be injected to numb your leg. You will feel some needle sticks and may feel discomfort as the medication goes in. Once this is done, you should not experience significant discomfort. But if you do, please let us know and more numbing medication can be injected. As the catheter sends out heat, the vein closes off and the catheter is withdrawn.  For the phlebectomy part of the procedure, small incisions are made where the bulging varicose veins have been marked on your leg(s) and these veins will be removed using a small mike hook instrument.    Post-Procedure  Once the procedure is done, your leg(s) will be washed with warm water and dried. Your leg(s) will be bandaged with large soft dressings and a large ACE bandage wrapped from toes to groin.   You will be offered something to drink and a light snack.  You will rest with your leg(s) elevated for approximately 30 minutes. Your friend or family member may join you.  For your safety, you will be taken to your car in a wheelchair. If you are able to, it is good  to keep your leg(s) elevated on the car ride home.    Post-Procedure Instructions:             VNUS Closure and Phlebectomies      Post-Op Day Zero - The Day of Your Procedure:0  1. Medication for Pain Control and Inflammation Control   - The numbing medication injected during your procedure will last for several hours. The pre-procedure                 tablets may make you very sleepy and you might not remember everything from the procedure or from                 the day. This will usually wear off by the next day.   - Ibuprofen:  If tolerated, take ibuprofen (e.g., Advil) to reduce inflammation whether or not you have                 pain. For three days, take two tablets (200 mg each) with every meal and at bedtime with a snack. If                 your pain is not controlled with ibuprofen, you may take prescription pain medication (such as Norco),                 if prescribed.   - You may resume taking any medications you were taking before your procedure.  2. Activity   - Rest with your leg(s) elevated above your heart. This will prevent from a lot of swelling and                 bleeding. You do not need to elevate your leg(s) while sleeping at night. You may go upstairs, sit up to                 eat, use the bathroom, and take several five-minute walks. Otherwise, keep your leg(s) elevated.                 Minimize the amount of time you are up on your feet to about 30 minutes at a time.  3. Bandages   - The incision sites will be covered with soft bandages and an ACE wrap. Keep your bandages on                 and dry for 48 hours. The ACE should provide  snug  compression but should not cause pain or                 numbness in the toes. If you have significant discomfort or your toes become cold or numb, unwrap                 your ACE and rewrap with less tension starting at the toes wrapping upward.  4. Incisions   - Bleeding: You may see some incision sites that are oozing through the bandages. This  is not unusual      and can be managed with Rest, Ice, Compression and Elevation (RICE). Apply ice and firm pressure      directly to the site that is bleeding and rest with your leg(s) elevated above your heart for 20-30                 minutes.    Post-Op Day One:  1. Medication   - Ibuprofen: Continue the same as the Day of Your Procedure. If your pain is not controlled with                 ibuprofen, you may take prescription pain medication (such as Norco), if prescribed.   2. Activity   - We would like you to get up at least six times and walk around for short periods of time, unless it is      causing you pain. You should not be on your feet more than 90 minutes at a time. Elevate your leg      above your heart when you are not walking.  3. Bandages   - Your bandages must be kept on and dry for 48 hours.  4. Driving   - You may resume driving when you can do so safely. Do not drive if you are taking narcotic pain      medication.  Post-Op Day Two:   1. Medication  - Ibuprofen: Continue the same as the Day of Your Procedure.  2.  Activity  - Walk as tolerated. Elevate as much as possible when not walking.  3. Bandages and Compression  - Remove ACE wrap and padding. Shower and put on your compression hose during waking hours only       for at least 5 days. (Your doctor may instruct you to keep your bandages on until your return     appointment; please follow your doctor's instructions.)  4. Incisions  - Your leg(s) will be bruised; there may be swelling, hard knots under the skin and possibly some     numbness. These will likely resolve over time. If you see  hair-like  strings coming out of your     incisions, do not pull them (this will only cause pain/discomfort). We will trim them when you come     back for your follow-up appointment.  5. Call Us If:   - You see any areas on your leg that are red and angry in appearance.   - You notice any drainage that is milky or cloudy in appearance or that has a foul  odor.   - You run a temperature of 100.5 or greater.    Post-Op Day Three:  You will have a follow up appointment 2-4 days post-procedure. At this appointment, you will have an ultrasound and we will check your incisions.    ________________________________________________________________________________________    The Two Weeks Following Your Procedure  1.  Skin Care   - Do not use any lotions, creams or powders on your incisions for 14 days or until the incisions have                 healed.   - Do not soak in a bathtub, hot tub or go swimming for 14 days or until your incisions have healed.  2.  Medications   - You may use ibuprofen or acetaminophen (e.g., Tylenol) as needed for pain or discomfort.  3.  Activity   - Do not lift over 25 pounds. After about two weeks you may resume exercise such as aerobics,                 running, tennis or weightlifting. Use your common sense and ease back into your exercise routine                 slowly.   - You may feel a cord-like tightness along the inside of your leg. Gentle stretching can be helpful.  4. Compression Hose   - Your doctor may instruct you to wear compression for longer than seven days; please follow your                 doctor's instructions. As a comfort measure, you may choose to wear compression for longer than                 required.  5.  Travel   - Do not fly in an airplane for 14 days after your procedure. If you have a long car trip planned within                 two to three weeks following your procedure, stop and walk for a few minutes every two hours.      Periodic ankle pumps during the ride may be helpful.    Six Week Appointment  - At your six-week appointment, you will see your surgeon for an exam and evaluation. This office visit     will be scheduled when you return for post-op day three return appointment.       Return to Work  1.  If you work outside the home, you may return to work in a few days depending on the extent of your         procedure, how you tolerate it, and the type of work you perform.  2.  Paperwork: If your employer requires paperwork or you would like a letter written to your employer, please        let us know. We will complete disability type forms at no charge. Please allow five business days for forms        to be completed.

## 2024-10-17 NOTE — LETTER
10/17/2024      Karime Ortiz  [address] [place] [address]      Dear Colleague,    Thank you for referring your patient, Karime Ortiz, to the [facility] [place]. Please see a copy of my visit note below.    Karime is a 59 year old who is being evaluated via a billable video visit.    How would you like to obtain your AVS? MyChart  If the video visit is dropped, the invitation should be resent by: Text to cell phone: [phone]  Will anyone else be joining your video visit? No    Video-Visit Details    Type of service:  Video Visit  Video visit start time: 9:02 AM  Video visit end time: 9:13 AM  Originating Location (pt. Location): Home    Distant Location (provider location):  On-site  Platform used for Video Visit: [employer]  Signed Electronically by: Carlos Orourke MD    [facility] [place] Progress Note    Karime Ortiz presents in follow-up of Left greater than right lower extremityvaricose veins with advanced lipodermatosclerosis and severe leg pain.  Please see my consultation of 10/4/2024 for details.    She returned for a bilateral lower extremity venous competency study on 10/16/2024, discussed at today's virtual visit.    Physical Exam  General: Pleasant female in no acute distress.  Blood pressure 129/77, pulse 66  Extremities:  Right lower extremity: 4 to 6 mm varicosities scattered over the right mid to distal medial thigh.  1+ edema of the right ankle.  No significant lipodermatosclerosis or stasis hyperpigmentation.     Left lower extremity: 8-10 mm varicosities over the left mid to distal medial thigh, extending onto the left medial calf.  Severe lipodermatosclerosis with a shrunken appearance of the distal third of the left leg anteromedially with intense stasis hyperpigmentation and tenderness in the area.  There is no significant erythema to suggest cellulitis.  1-2+ edema of the foot distal to the lipodermatosclerosis and down  the calf rash.      Ultrasound:  Name:  Karime Ortiz                                                Patient ID: [id number]  Date: 2024                                              : 1965  Sex: female                                                                 Examined by: JAMES Herrera RVT  Age:  59 year old                                                         Reading MD: RAMAN Orourke MD     INDICATION:Bilateral varicose veins with pain     EXAM TYPE  BILATERAL LOWER EXTREMITY VENOUS DUPLEX FOR VENOUS INSUFFICIENCY  TECHNICAL SUMMARY     A duplex ultrasound study using color flow was performed, to evaluate the bilateral lower extremity veins for valvular incompetence with the patient in a steep reversed Trendelenburg.      RIGHT:     The deep veins demonstrate phasic flow, compress and respond to augmentations.  There is no DVT.  The common femoral and distal femoral veins are incompetent and free of thrombus. The remaining deep veins are competent and free of thrombus.      The GSV demonstrates phasic flow, compresses and responds to augmentations from the saphenofemoral junction to the ankle with no evidence of thrombus. The great saphenous vein measures 9.4 mm at the saphenofemoral junction, 6.7 mm at the proximal thigh, and 4.1 mm at the knee. The GSV is incompetent from SFJ to Mid Thigh and Proximal Calf to Distal Calf, with the greatest reflux time of 11.0 seconds.  The GSV gives rise to multiple incompetent varicose veins, the largest measures 4.5 mm off the Proximal Calf that courses Medial  with a reflux time of 8.2 seconds.      The AASV is competent ( 4.1 mm) draining into the saphenofemoral junction.      The Giacomini vein is not visualized.     The SSV demonstrates phasic flow, compresses and responds to augmentations from the mid calf to the ankle.  No reflux or thrombus is seen. The SSV is not visualized from the popliteal space to the proximal calf. The  saphenopopliteal junction is not visualized.       Perforators: There is no evidence of incompetent  veins at any level.         LEFT:     The deep veins demonstrate phasic flow, compress and respond to augmentations.  There is no reflux or DVT.      The GSV demonstrates phasic flow, compresses and responds to augmentations at the saphenofemoral junction and from proximal calf to the ankle with no evidence of thrombus. The great saphenous vein measures 8.8 mm at the saphenofemoral junction. The GSV is incompetent at the SFJ and from Proximal Calf to Distal Calf, with the greatest reflux time of 2.7 seconds.        The AASV is incompetent ( 10.5 mm) draining into the saphenofemoral junction. The AASV is incompetent from the saphenofemoral junction to the mid thigh with a reflux time of 4.6 seconds. The AASV takes a straight course for 20 cm.  The AASV gives rise to a varicose branch measuring 8.6 mm off the Mid Thigh that courses Medial with a reflux time of 3.3 seconds.      The Giacomini vein is not visualized.     Chronic non-occlusive thrombus is visualized in the SSV at mid calf.  No reflux is seen. The SSV is not visualized from the popliteal space to proximal calf.  The saphenopopliteal junction is not visualized.       Perforators: There is no evidence of incompetent  veins at any level.         FINAL SUMMARY:  Right lower extremity:  Deep veins  1.  No deep vein thrombosis  2.  Common femoral and distal thigh femoral vein incompetence     Superficial veins  1.  No superficial vein thrombosis.  The small saphenous vein is not visualized from the saphenopopliteal junction to the proximal calf but is patent and competent distally  2.  Saphenofemoral junction through mid thigh and proximal calf to distal calf great saphenous vein incompetence, the source of incompetent varicosities in the thigh and calf      veins  No incompetent perforators     Left lower extremity:  Deep veins  No  deep vein thrombosis or deep vein valve incompetence     Superficial veins  1.  Absent great saphenous vein in the thigh.  The below-knee great saphenous vein is patent and incompetent.  2.  Absent  proximal calf small saphenous vein. Chronic, nonocclusive thrombus left mid calf small saphenous vein  3.  Anterior accessory saphenous vein incompetence, the source of large, incompetent varicosities coursing down the thigh and calf       veins  No incompetent perforators     Incompetence Criteria: Greater than 0.5 seconds in the superficial and  veins and greater than 1.0 second in the deep veins.     RAMAN Orourke MD, FACS       Assessment:  Left greater than right lower extremity varicose veins with pain and advanced lipodermatosclerosis.  We discussed her lower extremity venous competency study utilizing a leg vein drawing.  In her more symptomatic left lower extremity, her deep veins are competent.  Her left anterior accessory saphenous vein is incompetent, measures 10.5 mm  in the proximal thigh with reflux time of 4.6 seconds, the source of incompetent varicosities coursing down the thigh with reflux time of 3.9 seconds.  These varicosities coursed down to the area of lipodermatosclerosis in the distal third of the leg.    Given her advanced stasis changes and the severe pain that she is suffering, she is a candidate for radiofrequency ablation of her left great saphenous vein with medically necessary phlebectomies.  Details of procedure including risks of bleeding, infection, nerve injury, deep vein thrombosis were discussed.  She voiced understanding and is anxious to have her leg treated.    Plan:  Radiofrequency ablation of the left great saphenous vein with medically necessary phlebectomies    Carlos Orourke MD, FACS    Dictated using Dragon voice recognition software which may result in transcription errors            October 17, 2024    Vein Procedure  Recommendation    Called and spoke with patient.    Dr. Orourke has recommended patient to have the following vein procedure(s):     1. Left leg VNUS closure ASV and 10-20 Phlebectomies (medically necessary)    Patient Pre-op Questions:  [facility]  Anticoagulant/ASA: No  Artificial Joint or Heart Valve:  No  Open ulcer:  No  Sedation nausea: Yes    Patient is recommended to wear Thigh High compression hose following her procedure. Discussed compression hose. Explained to patient if insurance doesn't cover the compression hose there are a couple different options to getting them and to call the clinic to let us know if they need help.  Patient is going to have her son help her look online for purchasing if insurance does not cover.    Mailed patient written procedure instructions to review on her own (see After Visit Summary).    Next steps:    Insurance Submission  Informed patient this process could take up to 14 business days, but once approved, the patient will be contacted by our surgery scheduler to schedule the above procedure. Gave patient our surgery scheduler's information.    Informed patient to call our office regarding the status of their insurance authorization if it has been more than 3 weeks and have not heard from our surgery scheduler.    Patient is in agreement with all of the above and has no further questions at this time.    Oziel Oliva RN  [facility]  Vein Clinic      Again, thank you for allowing me to participate in the care of your patient.        Sincerely,        Carlos Orourke MD

## 2024-10-17 NOTE — PROGRESS NOTES
Karime is a 59 year old who is being evaluated via a billable video visit.    How would you like to obtain your AVS? MyChart  If the video visit is dropped, the invitation should be resent by: Text to cell phone: [phone]  Will anyone else be joining your video visit? No    Video-Visit Details    Type of service:  Video Visit  Video visit start time: 9:02 AM  Video visit end time: 9:13 AM  Originating Location (pt. Location): Home    Distant Location (provider location):  On-site  Platform used for Video Visit: Oksana  Signed Electronically by: Carlos Orourke MD    [facility] Progress Note    Karime Ortiz presents in follow-up of Left greater than right lower extremityvaricose veins with advanced lipodermatosclerosis and severe leg pain.  Please see my consultation of 10/4/2024 for details.    She returned for a bilateral lower extremity venous competency study on 10/16/2024, discussed at today's virtual visit.    Physical Exam  General: Pleasant female in no acute distress.  Blood pressure 129/77, pulse 66  Extremities:  Right lower extremity: 4 to 6 mm varicosities scattered over the right mid to distal medial thigh.  1+ edema of the right ankle.  No significant lipodermatosclerosis or stasis hyperpigmentation.     Left lower extremity: 8-10 mm varicosities over the left mid to distal medial thigh, extending onto the left medial calf.  Severe lipodermatosclerosis with a shrunken appearance of the distal third of the left leg anteromedially with intense stasis hyperpigmentation and tenderness in the area.  There is no significant erythema to suggest cellulitis.  1-2+ edema of the foot distal to the lipodermatosclerosis and down the calf rash.      Ultrasound:  Name:  Karime Ortiz                                                Patient ID: [id number]  Date: 2024                                              : 1965  Sex: female                                                                  Examined by: JAMES Herrera RVT  Age:  59 year old                                                         Reading MD: RAMAN Orourke MD     INDICATION:Bilateral varicose veins with pain     EXAM TYPE  BILATERAL LOWER EXTREMITY VENOUS DUPLEX FOR VENOUS INSUFFICIENCY  TECHNICAL SUMMARY     A duplex ultrasound study using color flow was performed, to evaluate the bilateral lower extremity veins for valvular incompetence with the patient in a steep reversed Trendelenburg.      RIGHT:     The deep veins demonstrate phasic flow, compress and respond to augmentations.  There is no DVT.  The common femoral and distal femoral veins are incompetent and free of thrombus. The remaining deep veins are competent and free of thrombus.      The GSV demonstrates phasic flow, compresses and responds to augmentations from the saphenofemoral junction to the ankle with no evidence of thrombus. The great saphenous vein measures 9.4 mm at the saphenofemoral junction, 6.7 mm at the proximal thigh, and 4.1 mm at the knee. The GSV is incompetent from SFJ to Mid Thigh and Proximal Calf to Distal Calf, with the greatest reflux time of 11.0 seconds.  The GSV gives rise to multiple incompetent varicose veins, the largest measures 4.5 mm off the Proximal Calf that courses Medial  with a reflux time of 8.2 seconds.      The AASV is competent ( 4.1 mm) draining into the saphenofemoral junction.      The Giacomini vein is not visualized.     The SSV demonstrates phasic flow, compresses and responds to augmentations from the mid calf to the ankle.  No reflux or thrombus is seen. The SSV is not visualized from the popliteal space to the proximal calf. The saphenopopliteal junction is not visualized.       Perforators: There is no evidence of incompetent  veins at any level.         LEFT:     The deep veins demonstrate phasic flow, compress and respond to augmentations.  There is no reflux or DVT.       The GSV demonstrates phasic flow, compresses and responds to augmentations at the saphenofemoral junction and from proximal calf to the ankle with no evidence of thrombus. The great saphenous vein measures 8.8 mm at the saphenofemoral junction. The GSV is incompetent at the SFJ and from Proximal Calf to Distal Calf, with the greatest reflux time of 2.7 seconds.        The AASV is incompetent ( 10.5 mm) draining into the saphenofemoral junction. The AASV is incompetent from the saphenofemoral junction to the mid thigh with a reflux time of 4.6 seconds. The AASV takes a straight course for 20 cm.  The AASV gives rise to a varicose branch measuring 8.6 mm off the Mid Thigh that courses Medial with a reflux time of 3.3 seconds.      The Giacomini vein is not visualized.     Chronic non-occlusive thrombus is visualized in the SSV at mid calf.  No reflux is seen. The SSV is not visualized from the popliteal space to proximal calf.  The saphenopopliteal junction is not visualized.       Perforators: There is no evidence of incompetent  veins at any level.         FINAL SUMMARY:  Right lower extremity:  Deep veins  1.  No deep vein thrombosis  2.  Common femoral and distal thigh femoral vein incompetence     Superficial veins  1.  No superficial vein thrombosis.  The small saphenous vein is not visualized from the saphenopopliteal junction to the proximal calf but is patent and competent distally  2.  Saphenofemoral junction through mid thigh and proximal calf to distal calf great saphenous vein incompetence, the source of incompetent varicosities in the thigh and calf      veins  No incompetent perforators     Left lower extremity:  Deep veins  No deep vein thrombosis or deep vein valve incompetence     Superficial veins  1.  Absent great saphenous vein in the thigh.  The below-knee great saphenous vein is patent and incompetent.  2.  Absent  proximal calf small saphenous vein. Chronic,  nonocclusive thrombus left mid calf small saphenous vein  3.  Anterior accessory saphenous vein incompetence, the source of large, incompetent varicosities coursing down the thigh and calf       veins  No incompetent perforators     Incompetence Criteria: Greater than 0.5 seconds in the superficial and  veins and greater than 1.0 second in the deep veins.     RAMAN rOourke MD, FACS       Assessment:  Left greater than right lower extremity varicose veins with pain and advanced lipodermatosclerosis.  We discussed her lower extremity venous competency study utilizing a leg vein drawing.  In her more symptomatic left lower extremity, her deep veins are competent.  Her left anterior accessory saphenous vein is incompetent, measures 10.5 mm  in the proximal thigh with reflux time of 4.6 seconds, the source of incompetent varicosities coursing down the thigh with reflux time of 3.9 seconds.  These varicosities coursed down to the area of lipodermatosclerosis in the distal third of the leg.    Given her advanced stasis changes and the severe pain that she is suffering, she is a candidate for radiofrequency ablation of her left great saphenous vein with medically necessary phlebectomies.  Details of procedure including risks of bleeding, infection, nerve injury, deep vein thrombosis were discussed.  She voiced understanding and is anxious to have her leg treated.    Plan:  Radiofrequency ablation of the left anterior accessory saphenous vein with medically necessary phlebectomies    Carlos Orourke MD, FACS    Dictated using Dragon voice recognition software which may result in transcription errors

## 2024-10-17 NOTE — PROGRESS NOTES
October 17, 2024    Vein Procedure Recommendation    Called and spoke with patient.    Dr. Orourke has recommended patient to have the following vein procedure(s):     1. Left leg VNUS closure ASV and 10-20 Phlebectomies (medically necessary)    Patient Pre-op Questions:  Preferred Pharmacy: [facility]  Anticoagulant/ASA: No  Artificial Joint or Heart Valve:  No  Open ulcer:  No  Sedation nausea: Yes    Patient is recommended to wear Thigh High compression hose following her procedure. Discussed compression hose. Explained to patient if insurance doesn't cover the compression hose there are a couple different options to getting them and to call the clinic to let us know if they need help.  Patient is going to have her son help her look online for purchasing if insurance does not cover.    Mailed patient written procedure instructions to review on her own (see After Visit Summary).    Next steps:    Insurance Submission  Informed patient this process could take up to 14 business days, but once approved, the patient will be contacted by our surgery scheduler to schedule the above procedure. Gave patient our surgery scheduler's information.    Informed patient to call our office regarding the status of their insurance authorization if it has been more than 3 weeks and have not heard from our surgery scheduler.    Patient is in agreement with all of the above and has no further questions at this time.    Oziel Oliva RN  [facility]  Vein Clinic

## 2024-11-07 ENCOUNTER — TELEPHONE (OUTPATIENT)
Dept: VASCULAR SURGERY | Facility: CLINIC | Age: 59
End: 2024-11-07
Payer: COMMERCIAL

## 2024-11-07 DIAGNOSIS — M79.3 LIPODERMATOSCLEROSIS OF LEFT LOWER EXTREMITY: Primary | ICD-10-CM

## 2024-11-07 DIAGNOSIS — I83.819 PAIN DUE TO VARICOSE VEINS OF LOWER EXTREMITY: ICD-10-CM

## 2024-11-07 NOTE — TELEPHONE ENCOUNTER
11/7/2024    Vein Clinic Preoperative Nurse Call    Procedure: Left leg VNUS closure ASV(med nec), 10-20 stab phlebs(med nec)   Date: Wednesday 11/13  Surgeon: Dr. Orourke  Time: 1400  Check in time: 1300    Called and spoke to patient. Informed patient: when to check in (1300) to sign consent, to bring their preop medications in their original bottle with them (3mg ativan, 0.1mg clonidine and 4mg zofran a half hour prior to sedation medications). Patient will take the medications after signing the consent to the procedure. Instructed patient to wear loose-fitting comfortable clothing, and bring their compression hose. Ensured patient has a /someone that will be responsible for them the rest of the day. Once procedure is completed, we will keep patient in recovery for 30-45 mins, and call  with aftercare instructions. Informed patient, that if possible, they should sit in the backseat to elevate their leg on the ride home.    Pt needs Thigh High compression hose for procedure. Status of the hose: patient has thigh high hose.    Patient understands if they have any of the following symptoms (fever, cough, shortness of breath, rash), they need to notify us immediately as they may need to cancel their procedure and reschedule for a later date.    Patient is in agreement with all of the above and has no further questions at this time.    Tiffanie Tran RN  [facility]

## 2024-11-08 RX ORDER — CLONIDINE HYDROCHLORIDE 0.1 MG/1
TABLET ORAL
Qty: 1 TABLET | Refills: 0 | Status: SHIPPED | OUTPATIENT
Start: 2024-11-08 | End: 2024-11-15

## 2024-11-08 RX ORDER — ONDANSETRON 4 MG/1
TABLET, FILM COATED ORAL
Qty: 1 TABLET | Refills: 0 | Status: SHIPPED | OUTPATIENT
Start: 2024-11-08 | End: 2024-11-15

## 2024-11-08 RX ORDER — LORAZEPAM 1 MG/1
TABLET ORAL
Qty: 3 TABLET | Refills: 0 | Status: SHIPPED | OUTPATIENT
Start: 2024-11-08 | End: 2024-11-15

## 2024-11-13 ENCOUNTER — OFFICE VISIT (OUTPATIENT)
Dept: VASCULAR SURGERY | Facility: CLINIC | Age: 59
End: 2024-11-13
Payer: COMMERCIAL

## 2024-11-13 VITALS — HEART RATE: 75 BPM | DIASTOLIC BLOOD PRESSURE: 74 MMHG | SYSTOLIC BLOOD PRESSURE: 104 MMHG | OXYGEN SATURATION: 94 %

## 2024-11-13 DIAGNOSIS — I83.812 VARICOSE VEINS OF LEFT LOWER EXTREMITY WITH PAIN: ICD-10-CM

## 2024-11-13 DIAGNOSIS — I83.12 VARICOSE VEINS OF LEFT LOWER EXTREMITY WITH INFLAMMATION: Primary | ICD-10-CM

## 2024-11-13 PROCEDURE — 36475 ENDOVENOUS RF 1ST VEIN: CPT | Mod: LT | Performed by: SURGERY

## 2024-11-13 PROCEDURE — 37765 STAB PHLEB VEINS XTR 10-20: CPT | Mod: LT | Performed by: SURGERY

## 2024-11-13 NOTE — PROGRESS NOTES
Vein Clinic Procedure Note    Indications:  Left leg pain with advanced lipodermatosclerosis; symptoms recalcitrant to conservative measures and progressing    Procedure:  Radiofrequency ablation left anterior accessory saphenous vein  Multiple, medically necessary phlebectomies left lower extremity      Surgeon  RADHA Oroukre MD    First Assistant  Brittney Ang MD    Procedure Description  Details of the procedure including risks of bleeding, infection, nerve injury, scarring, hyperpigmentation, deep vein thrombosis, recanalization of the anterior accessory saphenous vein and recurrent varicose veins all discussed.  The patient voiced understanding and wished to proceed.  Informed consent was obtained.     I had the patient stand and marked varicosities from the mid anteromedial thigh, down the distal medial thigh, medial to the knee onto the medial calf with an indelible marker.  We then proceeded the operating room, had the patient lie supine on the operating table, then prepped and draped the left lower extremity sterilely.    We took a timeout to confirm the appropriate operative site and procedure: Radiofrequency ablation left anterior accessory saphenous vein with medically necessary phlebectomies    VNUS Closure  I imaged the left lower extremity easily identifying the left anterior accessory saphenous vein.  I infiltrated the skin overlying the vein near the mid thigh where it broke through the fascia, placed a micropuncture needle into the vein followed by a micropuncture guidewire and a 6 Portuguese sheath.    We passed the closure fast device through the sheath, positioning of the tip of the device 2.9 to centimeters from the saphenofemoral junction under ultrasound guidance with the operating table in Trendelenburg position.  Tumescent anesthetic was injected along the course of the vein under ultrasound guidance with care to confirm catheter tip position prior to infiltrating the tissues in this location.   The same tumescent anesthetic was also injected around each of the marked varicosities.    After allowing the block to take effect and after confirming appropriate position, I applied compression to the proximal thigh anterior accessory saphenous vein with the ultrasound probe and additional width 2 fingerbreadths treating the first two 8 cm increments of the vein with 3 RF sessions each.  This completed treatment of the vein.    We treated down to near the mid thigh.  The patient was comfortable throughout.    After completing the pullback, I reimaged the vein and the vein to be non-compressible and closed.  The saphenofemoral junction and common femoral veins were fully compressible and free of thrombus. The sheath and the catheter were removed and hemostasis secured with pressure.    Phlebectomies  We made stab wounds beside each of the marked varicosities with 11 scalpel, retrieved the veins with either vein hooks or mike hooks, clamped them with mosquito clamps and avulsed them.  Hemostasis was secured with pressure.    After completing the phlebectomies, the leg was cleaned with saline solution and petroleum jelly was applied to the skin.  The leg was then dressed with ABD pads, cast padding and an Ace bandage from the toes to the groin.   We observed the patient for 30 minutes to ensure excellent hemostasis then took the patient to their ride in a wheelchair.  Post procedure instructions were given to the patient and her sister in verbal and written form.  They both voiced understanding and their questions were answered.    The patient tolerated the procedure well without evidence of allergic reaction or other complications and will return in 72 hours for a left lower extremity venous ultrasound.    Procedures        11/13/2024     2:22 PM   Flowsheet Data   Procedure Start Time: 14:22   Prep: Chloraprep   Side: Left   Tx Length (cm): LEFT ASV: 24.5   Junction (cm): LEFT ASV: 2.92   RF Cycles: LEFT ASV: 6    RF TX Time (Minutes): LEFT ASV: 2:00   # PHLEB Sites: LEFT LEG: 10   Sedation taken: Yes   Pre Pt. Physical / Cognitive Limitations: WNL   TOTAL Local anesthesia Injected (ml): 3   Max Volume Local Anesthesia (ml): 11   TOTAL Tumescent Injected volume (ml): 372   Max Volume Tumescent (ml): 572   Post Pt. Physical / Cognitive Limitations: WNL   Procedure End Time: 15:19   D/C Instructions given, states readiness to leave and escorted to car: Yes     Tumescent Concentration: Total Volume: 572ml - 0.9% Sodium Chloride 500ml Bag +  Lidocaine 1% with Epinephrine 1:100,000: 60ml + 8.4% Sodium Bicarbonate: 12ml    C Tino Orourke MD    Dictated using Dragon voice recognition software which may result in transcription errors

## 2024-11-13 NOTE — LETTER
11/13/2024      Karime Ortiz  [address] [facility] [address]      Dear Colleague,    Thank you for referring your patient, Karime Ortiz, to the [facility] [place]. Please see a copy of my visit note below.    Pre-procedure Nursing Note    Karime Ortiz presents to clinic for No chief complaint on file..   /Person Responsible for Patient: Maria Esther (sister)  Phone Number: [phone]    Prophylactic Medication:Anti-Nausea, zofran 4mg,   Time Taken: 1pm   Sedation Medication: Ativan, 3mg ,   Time Taken: 1pm and Clonidine, 0.1mg,   Time Taken: 1pm  Compression Stockings: Patient brought with today.  The procedure is being performed on E.  Patient understanding of procedure matches consent? YES    Patient's pre-procedure medications verified by Tia Trevizo.    Sussy Lakhani RN on 11/13/2024 at 1:02 PM          Vein Clinic Procedure Note    Indications:  Left leg pain with advanced lipodermatosclerosis; symptoms recalcitrant to conservative measures and progressing    Procedure:  Radiofrequency ablation left anterior accessory saphenous vein  Multiple, medically necessary phlebectomies left lower extremity      Surgeon  RADHA Orourke MD    First Assistant  Brittney Ang MD    Procedure Description  Details of the procedure including risks of bleeding, infection, nerve injury, scarring, hyperpigmentation, deep vein thrombosis, recanalization of the anterior accessory saphenous vein and recurrent varicose veins all discussed.  The patient voiced understanding and wished to proceed.  Informed consent was obtained.     I had the patient stand and marked varicosities from the mid anteromedial thigh, down the distal medial thigh, medial to the knee onto the medial calf with an indelible marker.  We then proceeded the operating room, had the patient lie supine on the operating table, then prepped and draped the left lower extremity sterilely.    We took a timeout to confirm the appropriate operative  site and procedure: Radiofrequency ablation left anterior accessory saphenous vein with medically necessary phlebectomies    VNUS Closure  I imaged the left lower extremity easily identifying the left anterior accessory saphenous vein.  I infiltrated the skin overlying the vein near the mid thigh where it broke through the fascia, placed a micropuncture needle into the vein followed by a micropuncture guidewire and a 6 Lebanese sheath.    We passed the closure fast device through the sheath, positioning of the tip of the device 2.9 to centimeters from the saphenofemoral junction under ultrasound guidance with the operating table in Trendelenburg position.  Tumescent anesthetic was injected along the course of the vein under ultrasound guidance with care to confirm catheter tip position prior to infiltrating the tissues in this location.  The same tumescent anesthetic was also injected around each of the marked varicosities.    After allowing the block to take effect and after confirming appropriate position, I applied compression to the proximal thigh anterior accessory saphenous vein with the ultrasound probe and additional width 2 fingerbreadths treating the first two 8 cm increments of the vein with 3 RF sessions each.  This completed treatment of the vein.    We treated down to near the mid thigh.  The patient was comfortable throughout.    After completing the pullback, I reimaged the vein and the vein to be non-compressible and closed.  The saphenofemoral junction and common femoral veins were fully compressible and free of thrombus. The sheath and the catheter were removed and hemostasis secured with pressure.    Phlebectomies  We made stab wounds beside each of the marked varicosities with 11 scalpel, retrieved the veins with either vein hooks or mike hooks, clamped them with mosquito clamps and avulsed them.  Hemostasis was secured with pressure.    After completing the phlebectomies, the leg was cleaned  with saline solution and petroleum jelly was applied to the skin.  The leg was then dressed with ABD pads, cast padding and an Ace bandage from the toes to the groin.   We observed the patient for 30 minutes to ensure excellent hemostasis then took the patient to their ride in a wheelchair.  Post procedure instructions were given to the patient and her sister in verbal and written form.  They both voiced understanding and their questions were answered.    The patient tolerated the procedure well without evidence of allergic reaction or other complications and will return in 72 hours for a left lower extremity venous ultrasound.    Procedures        11/13/2024     2:22 PM   Flowsheet Data   Procedure Start Time: 14:22   Prep: Chloraprep   Side: Left   Tx Length (cm): LEFT ASV: 24.5   Junction (cm): LEFT ASV: 2.92   RF Cycles: LEFT ASV: 6   RF TX Time (Minutes): LEFT ASV: 2:00   # PHLEB Sites: LEFT LEG: 10   Sedation taken: Yes   Pre Pt. Physical / Cognitive Limitations: WNL   TOTAL Local anesthesia Injected (ml): 3   Max Volume Local Anesthesia (ml): 11   TOTAL Tumescent Injected volume (ml): 372   Max Volume Tumescent (ml): 572   Post Pt. Physical / Cognitive Limitations: WNL   Procedure End Time: 15:19   D/C Instructions given, states readiness to leave and escorted to car: Yes     Tumescent Concentration: Total Volume: 572ml - 0.9% Sodium Chloride 500ml Bag +  Lidocaine 1% with Epinephrine 1:100,000: 60ml + 8.4% Sodium Bicarbonate: 12ml    C Tino Orourke MD    Dictated using Dragon voice recognition software which may result in transcription errors      Again, thank you for allowing me to participate in the care of your patient.        Sincerely,        Carlos Orourke MD

## 2024-11-13 NOTE — PROGRESS NOTES
Pre-procedure Nursing Note    Karime Ortiz presents to clinic for No chief complaint on file..   /Person Responsible for Patient: Maria Esther (sister)  Phone Number: [phone]    Prophylactic Medication:Anti-Nausea, zofran 4mg,   Time Taken: 1pm   Sedation Medication: Ativan, 3mg ,   Time Taken: 1pm and Clonidine, 0.1mg,   Time Taken: 1pm  Compression Stockings: Patient brought with today.  The procedure is being performed on LLE.  Patient understanding of procedure matches consent? YES    Patient's pre-procedure medications verified by Tia Trevizo.    Sussy Lakhani RN on 11/13/2024 at 1:02 PM

## 2024-11-15 ENCOUNTER — ANCILLARY PROCEDURE (OUTPATIENT)
Dept: ULTRASOUND IMAGING | Facility: CLINIC | Age: 59
End: 2024-11-15
Attending: SURGERY
Payer: COMMERCIAL

## 2024-11-15 ENCOUNTER — ALLIED HEALTH/NURSE VISIT (OUTPATIENT)
Dept: VASCULAR SURGERY | Facility: CLINIC | Age: 59
End: 2024-11-15
Attending: SURGERY
Payer: COMMERCIAL

## 2024-11-15 DIAGNOSIS — M79.3 LIPODERMATOSCLEROSIS OF LEFT LOWER EXTREMITY: ICD-10-CM

## 2024-11-15 DIAGNOSIS — Z09 POSTOP CHECK: Primary | ICD-10-CM

## 2024-11-15 PROCEDURE — 93971 EXTREMITY STUDY: CPT | Mod: LT | Performed by: SURGERY

## 2024-11-15 NOTE — PROGRESS NOTES
November 15, 2024    Vein Clinic Postoperative Nurse Note    Patient is here with her daughter for her 48 hour postoperative visit.    Procedure: Left leg VNUS closure ASV(med nec), 10-20 stab phlebs(med [facility])   Procedure Date: 11/13/24  Surgeon: Dr. Orourke    Ultrasound Result: The left ASV is closed 27.8mm from SFJ to distal thigh. No evidence of LLE DVT.    Physical Exam: Incisions are approximated without signs of infection.  Ecchymosis: moderate  Swelling: minimal  Paresthesia: pt denies numbness    Patient Questions or Concerns: Overall, pt is doing well and has no concerns.    Pt is able to don her thigh high compression hose.    Reviewed postoperative instructions with patient and provided her with written material of common things to expect from her procedure.    Patient's Next Vein Clinic Appointment: 6 week post op with Dr. Stanley (12/26/24).    Tiffanie Tran RN  [facility]

## 2024-12-26 ENCOUNTER — OFFICE VISIT (OUTPATIENT)
Dept: VASCULAR SURGERY | Facility: CLINIC | Age: 59
End: 2024-12-26
Payer: COMMERCIAL

## 2024-12-26 DIAGNOSIS — I87.2 CHRONIC VENOUS INSUFFICIENCY: ICD-10-CM

## 2024-12-26 DIAGNOSIS — I83.12 VARICOSE VEINS OF LEFT LOWER EXTREMITY WITH INFLAMMATION: Primary | ICD-10-CM

## 2024-12-26 DIAGNOSIS — M79.3 LIPODERMATOSCLEROSIS OF LEFT LOWER EXTREMITY: ICD-10-CM

## 2024-12-26 NOTE — PROGRESS NOTES
[facility] Consult Note    HPI:  Karime Ortiz is a 59 year old female who was seen today for 6-week follow-up following radiofrequency ablation of the left anterior accessory saphenous vein with medically necessary phlebectomies of the left lower extremity.  The patient was experiencing left leg pain with advanced lipodermatosclerosis and symptoms recalcitrant to conservative measures.  She has had no issues following the procedure and reports improvement in her symptoms of leg pain and aching.  She has some numbness at the top of her foot and in the medial thigh just above the knee but she says this is not particularly bothersome.  She has noticed a decrease in her swelling and has been wearing her compression consistently following the procedure.  She has had no issues with wound healing from the site of her phlebectomies.    PHH:    Past Medical History:   Diagnosis Date    Anxiety     Thyroid disease         Past Surgical History:   Procedure Laterality Date    GYN SURGERY         ALLERGIES:  Patient has no known allergies.    MEDS:    Current Outpatient Medications:     albuterol (PROAIR HFA/PROVENTIL HFA/VENTOLIN HFA) 108 (90 Base) MCG/ACT inhaler, Inhale 2 puffs into the lungs., Disp: , Rfl:     amLODIPine (NORVASC) 5 MG tablet, Take 1 tablet by mouth daily at 2 pm., Disp: , Rfl:     ASPIRIN PO, Take 325 mg by mouth daily as needed for moderate pain, Disp: , Rfl:     atorvastatin (LIPITOR) 40 MG tablet, Take 40 mg by mouth daily., Disp: , Rfl:     cetirizine (ZYRTEC) 10 MG tablet, Take 1 tablet by mouth daily., Disp: , Rfl:     Escitalopram Oxalate (LEXAPRO PO), Take 20 mg by mouth daily, Disp: , Rfl:     fluticasone (FLONASE) 50 MCG/ACT nasal spray, Spray 2 sprays in nostril., Disp: , Rfl:     HYDROCHLOROTHIAZIDE PO, Take 25 mg by mouth daily, Disp: , Rfl:     LEVOTHYROXINE SODIUM PO, Take 75 mcg by mouth daily , Disp: , Rfl:     multivitamin,  therapeutic (THERA-VIT) TABS tablet, Take 1 tablet by mouth daily as needed, Disp: , Rfl:     ondansetron (ZOFRAN) 4 MG tablet, Take 1 tablet (4 mg) by mouth every 8 hours as needed, Disp: 18 tablet, Rfl: 0    SOCIAL HABITS:    History   Smoking Status    Never   Smokeless Tobacco    Never     Social History    Substance and Sexual Activity      Alcohol use: Yes        Comment: Occasionally      History   Drug Use No       FAMILY HISTORY:  No family history on file.    PHYSICAL EXAMINATION:  Pleasant in no apparent distress  Bilateral lower extremity edema appreciated left greater than right with advanced lipodermatosclerosis changes of the left lower leg.  Incisional sites from stab phlebectomies appear to be well-healing with no open skin.  She is motor and sensory intact and has palpable pulses in her feet.  No open wounds ulcerations appreciated.    ASSESSMENT:   59-year-old female with severe chronic venous insufficiency of the left lower extremity with advanced lipodermatosclerosis and right leg varicose veins with swelling and pain.  She is status post radiofrequency ablation of the left anterior excess her saphenous vein as well as phlebectomies of the left lower extremity.  She is overall healing well and reports improvement of her symptoms of aching and leg swelling.  She has had no issues with wound healing.    Left Leg: CEAP 4b (lipodermatosclerosis)     PLAN:    -Will plan for 6-month follow-up with ultrasound to ensure ASV closure persists and to further evaluate symptoms.   - Continue daily compression therapy with 20-30mmHg on bilateral lower extremities       Marva Stanley MD, [facility]  Vascular Surgery    *This document has been dictated using Dragon voice recognition software which may result in some transcription errors*

## 2024-12-26 NOTE — LETTER
12/26/2024      Karime Ortiz  [address] [id number]      Dear Colleague,    Thank you for referring your patient, Karime Ortiz, to the [facility]. Please see a copy of my visit note below.                                          [facility] Consult Note    HPI:  Karime Ortiz is a 59 year old female who was seen today for 6-week follow-up following radiofrequency ablation of the left anterior accessory saphenous vein with medically necessary phlebectomies of the left lower extremity.  The patient was experiencing left leg pain with advanced lipodermatosclerosis and symptoms recalcitrant to conservative measures.  She has had no issues following the procedure and reports improvement in her symptoms of leg pain and aching.  She has some numbness at the top of her foot and in the medial thigh just above the knee but she says this is not particularly bothersome.  She has noticed a decrease in her swelling and has been wearing her compression consistently following the procedure.  She has had no issues with wound healing from the site of her phlebectomies.    PHH:    Past Medical History:   Diagnosis Date     Anxiety      Thyroid disease         Past Surgical History:   Procedure Laterality Date     GYN SURGERY         ALLERGIES:  Patient has no known allergies.    MEDS:    Current Outpatient Medications:      albuterol (PROAIR HFA/PROVENTIL HFA/VENTOLIN HFA) 108 (90 Base) MCG/ACT inhaler, Inhale 2 puffs into the lungs., Disp: , Rfl:      amLODIPine (NORVASC) 5 MG tablet, Take 1 tablet by mouth daily at 2 pm., Disp: , Rfl:      ASPIRIN PO, Take 325 mg by mouth daily as needed for moderate pain, Disp: , Rfl:      atorvastatin (LIPITOR) 40 MG tablet, Take 40 mg by mouth daily., Disp: , Rfl:      cetirizine (ZYRTEC) 10 MG tablet, Take 1 tablet by mouth daily., Disp: , Rfl:      Escitalopram Oxalate (LEXAPRO PO), Take 20 mg by mouth daily, Disp: , Rfl:       fluticasone (FLONASE) 50 MCG/ACT nasal spray, Spray 2 sprays in nostril., Disp: , Rfl:      HYDROCHLOROTHIAZIDE PO, Take 25 mg by mouth daily, Disp: , Rfl:      LEVOTHYROXINE SODIUM PO, Take 75 mcg by mouth daily , Disp: , Rfl:      multivitamin, therapeutic (THERA-VIT) TABS tablet, Take 1 tablet by mouth daily as needed, Disp: , Rfl:      ondansetron (ZOFRAN) 4 MG tablet, Take 1 tablet (4 mg) by mouth every 8 hours as needed, Disp: 18 tablet, Rfl: 0    SOCIAL HABITS:    History   Smoking Status     Never   Smokeless Tobacco     Never     Social History    Substance and Sexual Activity      Alcohol use: Yes        Comment: Occasionally      History   Drug Use No       FAMILY HISTORY:  No family history on file.    PHYSICAL EXAMINATION:  Pleasant in no apparent distress  Bilateral lower extremity edema appreciated left greater than right with advanced lipodermatosclerosis changes of the left lower leg.  Incisional sites from stab phlebectomies appear to be well-healing with no open skin.  She is motor and sensory intact and has palpable pulses in her feet.  No open wounds ulcerations appreciated.    ASSESSMENT:   59-year-old female with severe chronic venous insufficiency of the left lower extremity with advanced lipodermatosclerosis and right leg varicose veins with swelling and pain.  She is status post radiofrequency ablation of the left anterior excess her saphenous vein as well as phlebectomies of the left lower extremity.  She is overall healing well and reports improvement of her symptoms of aching and leg swelling.  She has had no issues with wound healing.    Left Leg: CEAP 4b (lipodermatosclerosis)     PLAN:    -Will plan for 6-month follow-up with ultrasound to ensure ASV closure persists and to further evaluate symptoms.   - Continue daily compression therapy with 20-30mmHg on bilateral lower extremities       Marva Stanley MD, [facility]  Vascular Surgery    *This document has been dictated using Dragon voice  recognition software which may result in some transcription errors*        Again, thank you for allowing me to participate in the care of your patient.        Sincerely,        Marva Stanley MD    Electronically signed

## 2025-07-06 ENCOUNTER — OFFICE VISIT (OUTPATIENT)
Dept: URGENT CARE | Facility: URGENT CARE | Age: 60
End: 2025-07-06
Payer: COMMERCIAL

## 2025-07-06 VITALS
OXYGEN SATURATION: 95 % | DIASTOLIC BLOOD PRESSURE: 69 MMHG | BODY MASS INDEX: 29.63 KG/M2 | TEMPERATURE: 97.7 F | HEART RATE: 74 BPM | HEIGHT: 62 IN | SYSTOLIC BLOOD PRESSURE: 119 MMHG | RESPIRATION RATE: 18 BRPM | WEIGHT: 161 LBS

## 2025-07-06 DIAGNOSIS — J45.909 UNCOMPLICATED ASTHMA, UNSPECIFIED ASTHMA SEVERITY, UNSPECIFIED WHETHER PERSISTENT: ICD-10-CM

## 2025-07-06 DIAGNOSIS — E03.9 HYPOTHYROIDISM, UNSPECIFIED TYPE: ICD-10-CM

## 2025-07-06 DIAGNOSIS — F32.A DEPRESSION, UNSPECIFIED DEPRESSION TYPE: ICD-10-CM

## 2025-07-06 DIAGNOSIS — M79.671 RIGHT FOOT PAIN: Primary | ICD-10-CM

## 2025-07-06 DIAGNOSIS — I10 BENIGN ESSENTIAL HYPERTENSION: ICD-10-CM

## 2025-07-06 DIAGNOSIS — E78.00 HYPERCHOLESTEREMIA: ICD-10-CM

## 2025-07-06 PROCEDURE — 3078F DIAST BP <80 MM HG: CPT | Performed by: FAMILY MEDICINE

## 2025-07-06 PROCEDURE — 3074F SYST BP LT 130 MM HG: CPT | Performed by: FAMILY MEDICINE

## 2025-07-06 PROCEDURE — 99204 OFFICE O/P NEW MOD 45 MIN: CPT | Performed by: FAMILY MEDICINE

## 2025-07-06 RX ORDER — LEVOTHYROXINE SODIUM 50 UG/1
50 TABLET ORAL
Qty: 30 TABLET | Refills: 0 | Status: SHIPPED | OUTPATIENT
Start: 2025-07-06 | End: 2025-08-05

## 2025-07-06 RX ORDER — AMLODIPINE BESYLATE 5 MG/1
5 TABLET ORAL DAILY
Qty: 90 TABLET | Refills: 0 | Status: SHIPPED | OUTPATIENT
Start: 2025-07-06 | End: 2025-10-04

## 2025-07-06 RX ORDER — ALBUTEROL SULFATE 90 UG/1
2 INHALANT RESPIRATORY (INHALATION) EVERY 6 HOURS
Qty: 18 G | Refills: 0 | Status: SHIPPED | OUTPATIENT
Start: 2025-07-06 | End: 2025-08-05

## 2025-07-06 RX ORDER — ESCITALOPRAM OXALATE 20 MG/1
20 TABLET ORAL DAILY
Qty: 90 TABLET | Refills: 0 | Status: SHIPPED | OUTPATIENT
Start: 2025-07-06 | End: 2025-10-04

## 2025-07-06 RX ORDER — NAPROXEN 500 MG/1
500 TABLET ORAL 2 TIMES DAILY WITH MEALS
Qty: 14 TABLET | Refills: 0 | Status: SHIPPED | OUTPATIENT
Start: 2025-07-06 | End: 2025-07-13

## 2025-07-06 RX ORDER — LEVOTHYROXINE SODIUM 75 UG/1
75 TABLET ORAL
Qty: 30 TABLET | Refills: 0 | Status: SHIPPED | OUTPATIENT
Start: 2025-07-06 | End: 2025-08-05

## 2025-07-06 RX ORDER — ATORVASTATIN CALCIUM 40 MG/1
40 TABLET, FILM COATED ORAL DAILY
Qty: 90 TABLET | Refills: 0 | Status: SHIPPED | OUTPATIENT
Start: 2025-07-06 | End: 2025-10-04

## 2025-07-06 NOTE — PROGRESS NOTES
"SUBJECTIVE: Karime Ortiz is a 60 year old female presenting with a chief complaint of rt heel pain, no injury and wanting medication refill.  Onset of symptoms was 3 day(s) ago.    Past Medical History:   Diagnosis Date    Anxiety     Thyroid disease      No Known Allergies  Social History     Tobacco Use    Smoking status: Never    Smokeless tobacco: Never   Substance Use Topics    Alcohol use: Yes     Comment: Occasionally       ROS:  SKIN: no rash  GI: no vomiting    OBJECTIVE:  /69 (BP Location: Right arm, Patient Position: Sitting, Cuff Size: Adult Regular)   Pulse 74   Temp 97.7  F (36.5  C) (Tympanic)   Resp 18   Ht 1.575 m (5' 2\")   Wt 73 kg (161 lb)   SpO2 95%   BMI 29.45 kg/m  GENERAL APPEARANCE: healthy, alert and no distress  NEURO: Normal strength and tone, sensory exam grossly normal,  normal speech and mentation  SKIN: no suspicious lesions or rashes  Rt achilles tenderness, no calf pain/swelling/redness      ICD-10-CM    1. Right foot pain  M79.671 naproxen (NAPROSYN) 500 MG tablet     Orthopedic  Referral      2. Hypercholesteremia  E78.00 atorvastatin (LIPITOR) 40 MG tablet      3. Hypothyroidism, unspecified type  E03.9 levothyroxine (SYNTHROID/LEVOTHROID) 75 MCG tablet     levothyroxine (SYNTHROID/LEVOTHROID) 50 MCG tablet      4. Depression, unspecified depression type  F32.A escitalopram (LEXAPRO) 20 MG tablet      5. Uncomplicated asthma, unspecified asthma severity, unspecified whether persistent  J45.909 albuterol (PROAIR HFA) 108 (90 Base) MCG/ACT inhaler      6. Benign essential hypertension  I10 amLODIPine (NORVASC) 5 MG tablet          Fluids/Rest, f/u if worse/not any better    "

## 2025-07-06 NOTE — PROGRESS NOTES
Urgent Care Clinic Visit    Chief Complaint   Patient presents with    Foot Pain     Patient here with complaint of right heel pain for the past 3 days with no known cause.               7/6/2025     1:41 PM   Additional Questions   Roomed by Pau Mcmahon MA   Accompanied by